# Patient Record
Sex: MALE | Race: WHITE | NOT HISPANIC OR LATINO | URBAN - METROPOLITAN AREA
[De-identification: names, ages, dates, MRNs, and addresses within clinical notes are randomized per-mention and may not be internally consistent; named-entity substitution may affect disease eponyms.]

---

## 2017-01-03 ENCOUNTER — OFFICE (AMBULATORY)
Dept: URBAN - METROPOLITAN AREA CLINIC 75 | Facility: CLINIC | Age: 37
End: 2017-01-03
Payer: COMMERCIAL

## 2017-01-03 VITALS
HEART RATE: 88 BPM | DIASTOLIC BLOOD PRESSURE: 88 MMHG | HEIGHT: 76 IN | SYSTOLIC BLOOD PRESSURE: 126 MMHG | WEIGHT: 240 LBS

## 2017-01-03 DIAGNOSIS — K50.90 CROHN'S DISEASE, UNSPECIFIED, WITHOUT COMPLICATIONS: ICD-10-CM

## 2017-01-03 PROCEDURE — 99213 OFFICE O/P EST LOW 20 MIN: CPT | Performed by: INTERNAL MEDICINE

## 2017-03-15 VITALS
DIASTOLIC BLOOD PRESSURE: 61 MMHG | HEART RATE: 66 BPM | DIASTOLIC BLOOD PRESSURE: 94 MMHG | DIASTOLIC BLOOD PRESSURE: 83 MMHG | RESPIRATION RATE: 14 BRPM | HEART RATE: 89 BPM | TEMPERATURE: 97.1 F | DIASTOLIC BLOOD PRESSURE: 84 MMHG | OXYGEN SATURATION: 97 % | DIASTOLIC BLOOD PRESSURE: 73 MMHG | SYSTOLIC BLOOD PRESSURE: 117 MMHG | SYSTOLIC BLOOD PRESSURE: 110 MMHG | OXYGEN SATURATION: 95 % | SYSTOLIC BLOOD PRESSURE: 112 MMHG | DIASTOLIC BLOOD PRESSURE: 72 MMHG | HEART RATE: 82 BPM | HEART RATE: 92 BPM | OXYGEN SATURATION: 99 % | RESPIRATION RATE: 18 BRPM | TEMPERATURE: 98 F | RESPIRATION RATE: 15 BRPM | HEIGHT: 76 IN | DIASTOLIC BLOOD PRESSURE: 67 MMHG | OXYGEN SATURATION: 98 % | RESPIRATION RATE: 16 BRPM | HEART RATE: 86 BPM | HEART RATE: 74 BPM | DIASTOLIC BLOOD PRESSURE: 75 MMHG | OXYGEN SATURATION: 96 % | SYSTOLIC BLOOD PRESSURE: 122 MMHG | WEIGHT: 240 LBS | HEART RATE: 87 BPM | HEART RATE: 67 BPM | SYSTOLIC BLOOD PRESSURE: 121 MMHG | SYSTOLIC BLOOD PRESSURE: 139 MMHG | HEART RATE: 88 BPM | SYSTOLIC BLOOD PRESSURE: 140 MMHG | DIASTOLIC BLOOD PRESSURE: 71 MMHG | SYSTOLIC BLOOD PRESSURE: 107 MMHG

## 2017-03-17 ENCOUNTER — OFFICE (AMBULATORY)
Dept: URBAN - METROPOLITAN AREA CLINIC 64 | Facility: CLINIC | Age: 37
End: 2017-03-17
Payer: COMMERCIAL

## 2017-03-17 ENCOUNTER — AMBULATORY SURGICAL CENTER (AMBULATORY)
Dept: URBAN - METROPOLITAN AREA SURGERY 17 | Facility: SURGERY | Age: 37
End: 2017-03-17
Payer: COMMERCIAL

## 2017-03-17 DIAGNOSIS — K29.50 UNSPECIFIED CHRONIC GASTRITIS WITHOUT BLEEDING: ICD-10-CM

## 2017-03-17 DIAGNOSIS — R19.7 DIARRHEA, UNSPECIFIED: ICD-10-CM

## 2017-03-17 DIAGNOSIS — K63.3 ULCER OF INTESTINE: ICD-10-CM

## 2017-03-17 DIAGNOSIS — K21.0 GASTRO-ESOPHAGEAL REFLUX DISEASE WITH ESOPHAGITIS: ICD-10-CM

## 2017-03-17 DIAGNOSIS — R10.84 GENERALIZED ABDOMINAL PAIN: ICD-10-CM

## 2017-03-17 DIAGNOSIS — K50.90 CROHN'S DISEASE, UNSPECIFIED, WITHOUT COMPLICATIONS: ICD-10-CM

## 2017-03-17 DIAGNOSIS — K31.89 OTHER DISEASES OF STOMACH AND DUODENUM: ICD-10-CM

## 2017-03-17 DIAGNOSIS — K92.2 GASTROINTESTINAL HEMORRHAGE, UNSPECIFIED: ICD-10-CM

## 2017-03-17 DIAGNOSIS — R93.3 ABNORMAL FINDINGS ON DIAGNOSTIC IMAGING OF OTHER PARTS OF DI: ICD-10-CM

## 2017-03-17 DIAGNOSIS — K52.9 NONINFECTIVE GASTROENTERITIS AND COLITIS, UNSPECIFIED: ICD-10-CM

## 2017-03-17 PROBLEM — K20.9 ESOPHAGITIS, UNSPECIFIED: Status: ACTIVE | Noted: 2017-03-17

## 2017-03-17 LAB
GI HISTOLOGY: A. SELECT: (no result)
GI HISTOLOGY: B. UNSPECIFIED: (no result)
GI HISTOLOGY: C. SELECT: (no result)
GI HISTOLOGY: D. SELECT: (no result)
GI HISTOLOGY: PDF REPORT: (no result)

## 2017-03-17 PROCEDURE — 88312 SPECIAL STAINS GROUP 1: CPT | Performed by: INTERNAL MEDICINE

## 2017-03-17 PROCEDURE — 88305 TISSUE EXAM BY PATHOLOGIST: CPT | Performed by: INTERNAL MEDICINE

## 2017-03-17 PROCEDURE — 43239 EGD BIOPSY SINGLE/MULTIPLE: CPT | Performed by: INTERNAL MEDICINE

## 2017-03-17 PROCEDURE — 88342 IMHCHEM/IMCYTCHM 1ST ANTB: CPT | Performed by: INTERNAL MEDICINE

## 2017-03-17 PROCEDURE — 45380 COLONOSCOPY AND BIOPSY: CPT | Performed by: INTERNAL MEDICINE

## 2017-03-17 RX ADMIN — PROPOFOL 50 MG: 10 INJECTION, EMULSION INTRAVENOUS at 11:13

## 2017-03-17 RX ADMIN — PROPOFOL 50 MG: 10 INJECTION, EMULSION INTRAVENOUS at 11:25

## 2017-03-17 RX ADMIN — PROPOFOL 50 MG: 10 INJECTION, EMULSION INTRAVENOUS at 11:17

## 2017-03-17 RX ADMIN — PROPOFOL 50 MG: 10 INJECTION, EMULSION INTRAVENOUS at 11:15

## 2017-03-17 RX ADMIN — PROPOFOL 100 MG: 10 INJECTION, EMULSION INTRAVENOUS at 11:13

## 2017-03-17 RX ADMIN — PROPOFOL 50 MG: 10 INJECTION, EMULSION INTRAVENOUS at 11:20

## 2017-03-17 NOTE — SERVICENOTES
If patient get's worse,,,he will need to present to hospital. Check Prom[facility] testing, cdiff and re-eval as to how to proceed with tx. 
Patient w/recent C diff infx ( tx'd ) .... so hard to say if today's findings are from residual C diff dx vs IBD.....

## 2017-03-17 NOTE — SERVICEHPINOTES
Patient having increased GI problems lately,,,recently found to be C diff +. Here today for EGD and CN given hx of Crohns,,and intermittent flares ( due to IBD vs Infx ?? )

## 2017-05-01 PROBLEM — K92.2 GASTROINTESTINAL HEMORRHAGE, UNSPECIFIED: Status: ACTIVE | Noted: 2017-03-17

## 2018-01-29 ENCOUNTER — OFFICE VISIT CONVERTED (OUTPATIENT)
Dept: GASTROENTEROLOGY | Facility: CLINIC | Age: 38
End: 2018-01-29
Attending: INTERNAL MEDICINE

## 2018-02-20 ENCOUNTER — OFFICE VISIT CONVERTED (OUTPATIENT)
Dept: FAMILY MEDICINE CLINIC | Age: 38
End: 2018-02-20
Attending: NURSE PRACTITIONER

## 2018-07-18 ENCOUNTER — OFFICE VISIT CONVERTED (OUTPATIENT)
Dept: GASTROENTEROLOGY | Facility: CLINIC | Age: 38
End: 2018-07-18
Attending: NURSE PRACTITIONER

## 2019-01-21 ENCOUNTER — HOSPITAL ENCOUNTER (OUTPATIENT)
Dept: LAB | Facility: HOSPITAL | Age: 39
Discharge: HOME | End: 2019-01-21
Attending: NURSE PRACTITIONER

## 2019-01-21 ENCOUNTER — OFFICE VISIT CONVERTED (OUTPATIENT)
Dept: GASTROENTEROLOGY | Facility: CLINIC | Age: 39
End: 2019-01-21
Attending: NURSE PRACTITIONER

## 2019-01-21 LAB
ALBUMIN SERPL-MCNC: 4.3 G/DL (ref 3.5–5)
ALBUMIN/GLOB SERPL: 1.2 {RATIO} (ref 1.4–2.6)
ALP SERPL-CCNC: 99 U/L (ref 53–128)
ALT SERPL-CCNC: 18 U/L (ref 10–40)
ANION GAP SERPL CALC-SCNC: 18 MMOL/L (ref 8–19)
AST SERPL-CCNC: 15 U/L (ref 15–50)
BASOPHILS # BLD AUTO: 0.03 10*3/UL (ref 0–0.2)
BASOPHILS NFR BLD AUTO: 0.21 % (ref 0–3)
BILIRUB SERPL-MCNC: 0.63 MG/DL (ref 0.2–1.3)
BUN SERPL-MCNC: 9 MG/DL (ref 5–25)
BUN/CREAT SERPL: 9 {RATIO} (ref 6–20)
CALCIUM SERPL-MCNC: 9.3 MG/DL (ref 8.7–10.4)
CHLORIDE SERPL-SCNC: 100 MMOL/L (ref 99–111)
CONV CO2: 25 MMOL/L (ref 22–32)
CONV TOTAL PROTEIN: 7.9 G/DL (ref 6.3–8.2)
CREAT UR-MCNC: 1.04 MG/DL (ref 0.7–1.2)
CRP SERPL-MCNC: 64.8 MG/L (ref 0–5)
EOSINOPHIL # BLD AUTO: 0.02 10*3/UL (ref 0–0.7)
EOSINOPHIL # BLD AUTO: 0.15 % (ref 0–7)
ERYTHROCYTE [DISTWIDTH] IN BLOOD BY AUTOMATED COUNT: 13.1 % (ref 11.5–14.5)
ERYTHROCYTE [SEDIMENTATION RATE] IN BLOOD: 16 MM/H (ref 0–20)
GFR SERPLBLD BASED ON 1.73 SQ M-ARVRAT: >60 ML/MIN/{1.73_M2}
GLOBULIN UR ELPH-MCNC: 3.6 G/DL (ref 2–3.5)
GLUCOSE SERPL-MCNC: 107 MG/DL (ref 70–99)
HBA1C MFR BLD: 15.2 G/DL (ref 14–18)
HCT VFR BLD AUTO: 43.9 % (ref 42–52)
LYMPHOCYTES # BLD AUTO: 1.4 10*3/UL (ref 1–5)
MCH RBC QN AUTO: 30 PG (ref 27–31)
MCHC RBC AUTO-ENTMCNC: 34.6 G/DL (ref 33–37)
MCV RBC AUTO: 86.9 FL (ref 80–96)
MONOCYTES # BLD AUTO: 0.8 10*3/UL (ref 0.2–1.2)
MONOCYTES NFR BLD AUTO: 5.35 % (ref 3–10)
NEUTROPHILS # BLD AUTO: 12.6 10*3/UL (ref 2–8)
NEUTROPHILS NFR BLD AUTO: 84.9 % (ref 30–85)
NRBC BLD AUTO-RTO: 0 % (ref 0–0.01)
OSMOLALITY SERPL CALC.SUM OF ELEC: 287 MOSM/KG (ref 273–304)
PLATELET # BLD AUTO: 208 10*3/UL (ref 130–400)
PMV BLD AUTO: 10.4 FL (ref 7.4–10.4)
POTASSIUM SERPL-SCNC: 3.9 MMOL/L (ref 3.5–5.3)
RBC # BLD AUTO: 5.05 10*6/UL (ref 4.7–6.1)
SODIUM SERPL-SCNC: 139 MMOL/L (ref 135–147)
VARIANT LYMPHS NFR BLD MANUAL: 9.4 % (ref 20–45)
WBC # BLD AUTO: 14.9 10*3/UL (ref 4.8–10.8)

## 2019-01-28 ENCOUNTER — OFFICE VISIT CONVERTED (OUTPATIENT)
Dept: FAMILY MEDICINE CLINIC | Age: 39
End: 2019-01-28
Attending: NURSE PRACTITIONER

## 2019-02-08 ENCOUNTER — HOSPITAL ENCOUNTER (OUTPATIENT)
Dept: INFUSION THERAPY | Facility: HOSPITAL | Age: 39
Setting detail: RECURRING SERIES
Discharge: HOME | End: 2019-02-28
Attending: NURSE PRACTITIONER

## 2019-02-25 ENCOUNTER — OFFICE VISIT CONVERTED (OUTPATIENT)
Dept: FAMILY MEDICINE CLINIC | Age: 39
End: 2019-02-25
Attending: NURSE PRACTITIONER

## 2019-05-20 ENCOUNTER — OFFICE VISIT CONVERTED (OUTPATIENT)
Dept: UROLOGY | Facility: CLINIC | Age: 39
End: 2019-05-20
Attending: UROLOGY

## 2019-06-28 ENCOUNTER — PROCEDURE VISIT CONVERTED (OUTPATIENT)
Dept: UROLOGY | Facility: CLINIC | Age: 39
End: 2019-06-28
Attending: UROLOGY

## 2019-07-19 ENCOUNTER — OFFICE VISIT CONVERTED (OUTPATIENT)
Dept: UROLOGY | Facility: CLINIC | Age: 39
End: 2019-07-19
Attending: UROLOGY

## 2019-07-22 ENCOUNTER — OFFICE VISIT CONVERTED (OUTPATIENT)
Dept: GASTROENTEROLOGY | Facility: CLINIC | Age: 39
End: 2019-07-22
Attending: NURSE PRACTITIONER

## 2019-07-22 ENCOUNTER — HOSPITAL ENCOUNTER (OUTPATIENT)
Dept: LAB | Facility: HOSPITAL | Age: 39
Discharge: HOME | End: 2019-07-22
Attending: NURSE PRACTITIONER

## 2019-07-22 LAB
25(OH)D3 SERPL-MCNC: 30.3 NG/ML (ref 30–100)
ALBUMIN SERPL-MCNC: 4.3 G/DL (ref 3.5–5)
ALBUMIN/GLOB SERPL: 1.3 {RATIO} (ref 1.4–2.6)
ALP SERPL-CCNC: 123 U/L (ref 53–128)
ALT SERPL-CCNC: 21 U/L (ref 10–40)
ANION GAP SERPL CALC-SCNC: 20 MMOL/L (ref 8–19)
AST SERPL-CCNC: 21 U/L (ref 15–50)
BASOPHILS # BLD AUTO: 0.07 10*3/UL (ref 0–0.2)
BASOPHILS NFR BLD AUTO: 0.7 % (ref 0–3)
BILIRUB SERPL-MCNC: 0.36 MG/DL (ref 0.2–1.3)
BUN SERPL-MCNC: 10 MG/DL (ref 5–25)
BUN/CREAT SERPL: 12 {RATIO} (ref 6–20)
CALCIUM SERPL-MCNC: 9.4 MG/DL (ref 8.7–10.4)
CHLORIDE SERPL-SCNC: 105 MMOL/L (ref 99–111)
CONV ABS IMM GRAN: 0.04 10*3/UL (ref 0–0.2)
CONV CO2: 21 MMOL/L (ref 22–32)
CONV IMMATURE GRAN: 0.4 % (ref 0–1.8)
CONV TOTAL PROTEIN: 7.7 G/DL (ref 6.3–8.2)
CREAT UR-MCNC: 0.83 MG/DL (ref 0.7–1.2)
CRP SERPL-MCNC: 21.2 MG/L (ref 0–5)
DEPRECATED RDW RBC AUTO: 44.7 FL (ref 35.1–43.9)
EOSINOPHIL # BLD AUTO: 0.44 10*3/UL (ref 0–0.7)
EOSINOPHIL # BLD AUTO: 4.4 % (ref 0–7)
ERYTHROCYTE [DISTWIDTH] IN BLOOD BY AUTOMATED COUNT: 13.9 % (ref 11.6–14.4)
ERYTHROCYTE [SEDIMENTATION RATE] IN BLOOD: 5 MM/H (ref 0–20)
GFR SERPLBLD BASED ON 1.73 SQ M-ARVRAT: >60 ML/MIN/{1.73_M2}
GLOBULIN UR ELPH-MCNC: 3.4 G/DL (ref 2–3.5)
GLUCOSE SERPL-MCNC: 89 MG/DL (ref 70–99)
HBA1C MFR BLD: 14.4 G/DL (ref 14–18)
HCT VFR BLD AUTO: 44.2 % (ref 42–52)
IRON SATN MFR SERPL: 21 % (ref 20–55)
IRON SERPL-MCNC: 67 UG/DL (ref 70–180)
LYMPHOCYTES # BLD AUTO: 2.92 10*3/UL (ref 1–5)
MCH RBC QN AUTO: 28.9 PG (ref 27–31)
MCHC RBC AUTO-ENTMCNC: 32.6 G/DL (ref 33–37)
MCV RBC AUTO: 88.8 FL (ref 80–96)
MONOCYTES # BLD AUTO: 0.68 10*3/UL (ref 0.2–1.2)
MONOCYTES NFR BLD AUTO: 6.8 % (ref 3–10)
NEUTROPHILS # BLD AUTO: 5.91 10*3/UL (ref 2–8)
NEUTROPHILS NFR BLD AUTO: 58.7 % (ref 30–85)
NRBC CBCN: 0 % (ref 0–0.7)
OSMOLALITY SERPL CALC.SUM OF ELEC: 293 MOSM/KG (ref 273–304)
PLATELET # BLD AUTO: 287 10*3/UL (ref 130–400)
PMV BLD AUTO: 12.4 FL (ref 9.4–12.4)
POTASSIUM SERPL-SCNC: 3.8 MMOL/L (ref 3.5–5.3)
RBC # BLD AUTO: 4.98 10*6/UL (ref 4.7–6.1)
SODIUM SERPL-SCNC: 142 MMOL/L (ref 135–147)
TIBC SERPL-MCNC: 326 UG/DL (ref 245–450)
TRANSFERRIN SERPL-MCNC: 228 MG/DL (ref 215–365)
VARIANT LYMPHS NFR BLD MANUAL: 29 % (ref 20–45)
WBC # BLD AUTO: 10.06 10*3/UL (ref 4.8–10.8)

## 2019-07-24 LAB
CONV QUANTIFERON TB GOLD: NEGATIVE
QUANTIFERON CRITERIA: NORMAL
QUANTIFERON MITOGEN VALUE: >10 IU/ML
QUANTIFERON NIL VALUE: 0.06 IU/ML
QUANTIFERON TB1 AG VALUE: 0.09 IU/ML
QUANTIFERON TB2 AG VALUE: 0.08 IU/ML

## 2019-10-03 ENCOUNTER — HOSPITAL ENCOUNTER (OUTPATIENT)
Dept: OTHER | Facility: HOSPITAL | Age: 39
Discharge: HOME | End: 2019-10-03
Attending: FAMILY MEDICINE

## 2019-10-03 ENCOUNTER — OFFICE VISIT CONVERTED (OUTPATIENT)
Dept: FAMILY MEDICINE CLINIC | Age: 39
End: 2019-10-03
Attending: FAMILY MEDICINE

## 2019-10-03 LAB
CHOLEST SERPL-MCNC: 140 MG/DL (ref 107–200)
CHOLEST/HDLC SERPL: 6.4 {RATIO} (ref 3–6)
HDLC SERPL-MCNC: 22 MG/DL (ref 40–60)
LDLC SERPL CALC-MCNC: 77 MG/DL (ref 70–100)
TRIGL SERPL-MCNC: 203 MG/DL (ref 40–150)
TSH SERPL-ACNC: 1.86 M[IU]/L (ref 0.27–4.2)
VLDLC SERPL-MCNC: 41 MG/DL (ref 5–37)

## 2019-10-21 ENCOUNTER — OFFICE VISIT CONVERTED (OUTPATIENT)
Dept: UROLOGY | Facility: CLINIC | Age: 39
End: 2019-10-21
Attending: UROLOGY

## 2019-12-02 ENCOUNTER — HOSPITAL ENCOUNTER (OUTPATIENT)
Dept: OTHER | Facility: HOSPITAL | Age: 39
Discharge: HOME | End: 2019-12-02
Attending: FAMILY MEDICINE

## 2019-12-02 ENCOUNTER — OFFICE VISIT CONVERTED (OUTPATIENT)
Dept: FAMILY MEDICINE CLINIC | Age: 39
End: 2019-12-02
Attending: FAMILY MEDICINE

## 2019-12-04 LAB — BACTERIA SPEC AEROBE CULT: NORMAL

## 2020-02-26 ENCOUNTER — HOSPITAL ENCOUNTER (OUTPATIENT)
Dept: OTHER | Facility: HOSPITAL | Age: 40
Discharge: HOME | End: 2020-02-26
Attending: FAMILY MEDICINE

## 2020-02-26 LAB
CHOLEST SERPL-MCNC: 155 MG/DL (ref 107–200)
CHOLEST/HDLC SERPL: 6 {RATIO} (ref 3–6)
HDLC SERPL-MCNC: 26 MG/DL (ref 40–60)
LDLC SERPL CALC-MCNC: 95 MG/DL (ref 70–100)
TRIGL SERPL-MCNC: 172 MG/DL (ref 40–150)
VLDLC SERPL-MCNC: 34 MG/DL (ref 5–37)

## 2020-02-27 ENCOUNTER — OFFICE VISIT CONVERTED (OUTPATIENT)
Dept: FAMILY MEDICINE CLINIC | Age: 40
End: 2020-02-27
Attending: FAMILY MEDICINE

## 2020-03-02 ENCOUNTER — OFFICE VISIT CONVERTED (OUTPATIENT)
Dept: GASTROENTEROLOGY | Facility: CLINIC | Age: 40
End: 2020-03-02
Attending: NURSE PRACTITIONER

## 2020-06-15 ENCOUNTER — OFFICE VISIT CONVERTED (OUTPATIENT)
Dept: FAMILY MEDICINE CLINIC | Age: 40
End: 2020-06-15
Attending: FAMILY MEDICINE

## 2020-06-15 ENCOUNTER — HOSPITAL ENCOUNTER (OUTPATIENT)
Dept: OTHER | Facility: HOSPITAL | Age: 40
Discharge: HOME | End: 2020-06-15
Attending: FAMILY MEDICINE

## 2020-06-19 ENCOUNTER — HOSPITAL ENCOUNTER (OUTPATIENT)
Dept: PHYSICAL THERAPY | Facility: CLINIC | Age: 40
Setting detail: RECURRING SERIES
Discharge: HOME | End: 2020-09-18
Attending: FAMILY MEDICINE

## 2020-07-29 ENCOUNTER — HOSPITAL ENCOUNTER (OUTPATIENT)
Dept: OTHER | Facility: HOSPITAL | Age: 40
Discharge: HOME | End: 2020-07-29
Attending: NURSE PRACTITIONER

## 2020-07-29 LAB
ALBUMIN SERPL-MCNC: 4.3 G/DL (ref 3.5–5)
ALBUMIN/GLOB SERPL: 1.4 {RATIO} (ref 1.4–2.6)
ALP SERPL-CCNC: 114 U/L (ref 53–128)
ALT SERPL-CCNC: 13 U/L (ref 10–40)
ANION GAP SERPL CALC-SCNC: 17 MMOL/L (ref 8–19)
AST SERPL-CCNC: 17 U/L (ref 15–50)
BASOPHILS # BLD AUTO: 0.08 10*3/UL (ref 0–0.2)
BASOPHILS NFR BLD AUTO: 0.8 % (ref 0–3)
BILIRUB SERPL-MCNC: 0.32 MG/DL (ref 0.2–1.3)
BUN SERPL-MCNC: 9 MG/DL (ref 5–25)
BUN/CREAT SERPL: 11 {RATIO} (ref 6–20)
CALCIUM SERPL-MCNC: 9.9 MG/DL (ref 8.7–10.4)
CHLORIDE SERPL-SCNC: 107 MMOL/L (ref 99–111)
CONV ABS IMM GRAN: 0.03 10*3/UL (ref 0–0.2)
CONV CO2: 23 MMOL/L (ref 22–32)
CONV IMMATURE GRAN: 0.3 % (ref 0–1.8)
CONV TOTAL PROTEIN: 7.3 G/DL (ref 6.3–8.2)
CREAT UR-MCNC: 0.8 MG/DL (ref 0.7–1.2)
DEPRECATED RDW RBC AUTO: 46 FL (ref 35.1–43.9)
EOSINOPHIL # BLD AUTO: 0.35 10*3/UL (ref 0–0.7)
EOSINOPHIL # BLD AUTO: 3.6 % (ref 0–7)
ERYTHROCYTE [DISTWIDTH] IN BLOOD BY AUTOMATED COUNT: 14.2 % (ref 11.6–14.4)
GFR SERPLBLD BASED ON 1.73 SQ M-ARVRAT: >60 ML/MIN/{1.73_M2}
GLOBULIN UR ELPH-MCNC: 3 G/DL (ref 2–3.5)
GLUCOSE SERPL-MCNC: 86 MG/DL (ref 70–99)
HCT VFR BLD AUTO: 46.5 % (ref 42–52)
HGB BLD-MCNC: 14.8 G/DL (ref 14–18)
LYMPHOCYTES # BLD AUTO: 2.67 10*3/UL (ref 1–5)
LYMPHOCYTES NFR BLD AUTO: 27.4 % (ref 20–45)
MCH RBC QN AUTO: 28.5 PG (ref 27–31)
MCHC RBC AUTO-ENTMCNC: 31.8 G/DL (ref 33–37)
MCV RBC AUTO: 89.4 FL (ref 80–96)
MONOCYTES # BLD AUTO: 0.57 10*3/UL (ref 0.2–1.2)
MONOCYTES NFR BLD AUTO: 5.8 % (ref 3–10)
NEUTROPHILS # BLD AUTO: 6.06 10*3/UL (ref 2–8)
NEUTROPHILS NFR BLD AUTO: 62.1 % (ref 30–85)
NRBC CBCN: 0 % (ref 0–0.7)
OSMOLALITY SERPL CALC.SUM OF ELEC: 294 MOSM/KG (ref 273–304)
PLATELET # BLD AUTO: 301 10*3/UL (ref 130–400)
PMV BLD AUTO: 12.5 FL (ref 9.4–12.4)
POTASSIUM SERPL-SCNC: 3.9 MMOL/L (ref 3.5–5.3)
RBC # BLD AUTO: 5.2 10*6/UL (ref 4.7–6.1)
SODIUM SERPL-SCNC: 143 MMOL/L (ref 135–147)
WBC # BLD AUTO: 9.76 10*3/UL (ref 4.8–10.8)

## 2020-09-02 ENCOUNTER — OFFICE VISIT CONVERTED (OUTPATIENT)
Dept: GASTROENTEROLOGY | Facility: CLINIC | Age: 40
End: 2020-09-02
Attending: NURSE PRACTITIONER

## 2020-09-03 ENCOUNTER — HOSPITAL ENCOUNTER (OUTPATIENT)
Dept: OTHER | Facility: HOSPITAL | Age: 40
Discharge: HOME | End: 2020-09-03
Attending: FAMILY MEDICINE

## 2020-09-03 LAB — ERYTHROCYTE [SEDIMENTATION RATE] IN BLOOD: 14 MM/H (ref 0–20)

## 2020-09-04 LAB
25(OH)D3 SERPL-MCNC: 31.1 NG/ML (ref 30–100)
ALBUMIN SERPL-MCNC: 4 G/DL (ref 3.5–5)
ALBUMIN/GLOB SERPL: 1.5 {RATIO} (ref 1.4–2.6)
ALP SERPL-CCNC: 113 U/L (ref 53–128)
ALT SERPL-CCNC: 13 U/L (ref 10–40)
ANION GAP SERPL CALC-SCNC: 14 MMOL/L (ref 8–19)
AST SERPL-CCNC: 15 U/L (ref 15–50)
BASOPHILS # BLD MANUAL: 0.07 10*3/UL (ref 0–0.2)
BASOPHILS NFR BLD MANUAL: 0.9 % (ref 0–3)
BILIRUB SERPL-MCNC: 0.47 MG/DL (ref 0.2–1.3)
BUN SERPL-MCNC: 7 MG/DL (ref 5–25)
BUN/CREAT SERPL: 9 {RATIO} (ref 6–20)
CALCIUM SERPL-MCNC: 9 MG/DL (ref 8.7–10.4)
CHLORIDE SERPL-SCNC: 105 MMOL/L (ref 99–111)
CHOLEST SERPL-MCNC: 144 MG/DL (ref 107–200)
CHOLEST/HDLC SERPL: 6.5 {RATIO} (ref 3–6)
CONV CO2: 24 MMOL/L (ref 22–32)
CONV TOTAL PROTEIN: 6.7 G/DL (ref 6.3–8.2)
CREAT UR-MCNC: 0.74 MG/DL (ref 0.7–1.2)
CRP SERPL-MCNC: 22 MG/L (ref 0–5)
DEPRECATED RDW RBC AUTO: 44.5 FL
EOSINOPHIL # BLD MANUAL: 0.36 10*3/UL (ref 0–0.7)
EOSINOPHIL NFR BLD MANUAL: 4.6 % (ref 0–7)
ERYTHROCYTE [DISTWIDTH] IN BLOOD BY AUTOMATED COUNT: 13.9 % (ref 11.5–14.5)
GFR SERPLBLD BASED ON 1.73 SQ M-ARVRAT: >60 ML/MIN/{1.73_M2}
GLOBULIN UR ELPH-MCNC: 2.7 G/DL (ref 2–3.5)
GLUCOSE SERPL-MCNC: 89 MG/DL (ref 70–99)
GRANS (ABSOLUTE): 4.67 10*3/UL (ref 2–8)
GRANS: 59.7 % (ref 30–85)
HBA1C MFR BLD: 14.3 G/DL (ref 14–18)
HCT VFR BLD AUTO: 43.4 % (ref 42–52)
HDLC SERPL-MCNC: 22 MG/DL (ref 40–60)
IMM GRANULOCYTES # BLD: 0.01 10*3/UL (ref 0–0.54)
IMM GRANULOCYTES NFR BLD: 0.1 % (ref 0–0.43)
IRON SATN MFR SERPL: 26 % (ref 20–55)
IRON SERPL-MCNC: 81 UG/DL (ref 70–180)
LDLC SERPL CALC-MCNC: 92 MG/DL (ref 70–100)
LYMPHOCYTES # BLD MANUAL: 2.24 10*3/UL (ref 1–5)
LYMPHOCYTES NFR BLD MANUAL: 6.1 % (ref 3–10)
MCH RBC QN AUTO: 28.4 PG (ref 27–31)
MCHC RBC AUTO-ENTMCNC: 32.9 G/DL (ref 33–37)
MCV RBC AUTO: 86.3 FL (ref 80–96)
MONOCYTES # BLD AUTO: 0.48 10*3/UL (ref 0.2–1.2)
OSMOLALITY SERPL CALC.SUM OF ELEC: 285 MOSM/KG (ref 273–304)
PLATELET # BLD AUTO: 300 10*3/UL (ref 130–400)
PMV BLD AUTO: 11.4 FL (ref 7.4–10.4)
POTASSIUM SERPL-SCNC: 3.7 MMOL/L (ref 3.5–5.3)
RBC # BLD AUTO: 5.03 10*6/UL (ref 4.7–6.1)
SODIUM SERPL-SCNC: 139 MMOL/L (ref 135–147)
TIBC SERPL-MCNC: 310 UG/DL (ref 245–450)
TRANSFERRIN SERPL-MCNC: 217 MG/DL (ref 215–365)
TRIGL SERPL-MCNC: 150 MG/DL (ref 40–150)
TSH SERPL-ACNC: 2.62 M[IU]/L (ref 0.27–4.2)
VARIANT LYMPHS NFR BLD MANUAL: 28.6 % (ref 20–45)
VLDLC SERPL-MCNC: 30 MG/DL (ref 5–37)
WBC # BLD AUTO: 7.83 10*3/UL (ref 4.8–10.8)

## 2020-09-08 ENCOUNTER — OFFICE VISIT CONVERTED (OUTPATIENT)
Dept: FAMILY MEDICINE CLINIC | Age: 40
End: 2020-09-08
Attending: FAMILY MEDICINE

## 2020-09-08 LAB
CONV QUANTIFERON TB GOLD: NEGATIVE
QUANTIFERON CRITERIA: NORMAL
QUANTIFERON MITOGEN VALUE: >10 IU/ML
QUANTIFERON NIL VALUE: 0.05 IU/ML
QUANTIFERON TB1 AG VALUE: 0.1 IU/ML
QUANTIFERON TB2 AG VALUE: 0.08 IU/ML

## 2020-09-18 ENCOUNTER — HOSPITAL ENCOUNTER (OUTPATIENT)
Dept: OTHER | Facility: HOSPITAL | Age: 40
Discharge: HOME | End: 2020-09-18
Attending: FAMILY MEDICINE

## 2020-10-30 ENCOUNTER — HOSPITAL ENCOUNTER (OUTPATIENT)
Dept: OTHER | Facility: HOSPITAL | Age: 40
Discharge: HOME | End: 2020-10-30
Attending: NURSE PRACTITIONER

## 2020-11-04 ENCOUNTER — HOSPITAL ENCOUNTER (OUTPATIENT)
Dept: GASTROENTEROLOGY | Facility: HOSPITAL | Age: 40
Setting detail: HOSPITAL OUTPATIENT SURGERY
Discharge: HOME | End: 2020-11-04
Attending: INTERNAL MEDICINE

## 2020-11-04 LAB — SARS-COV-2 RNA SPEC QL NAA+PROBE: NOT DETECTED

## 2020-11-05 LAB — SARS-COV-2 RNA SPEC QL NAA+PROBE: NOT DETECTED

## 2021-01-07 ENCOUNTER — HOSPITAL ENCOUNTER (OUTPATIENT)
Dept: OTHER | Facility: HOSPITAL | Age: 41
Discharge: HOME | End: 2021-01-07
Attending: NURSE PRACTITIONER

## 2021-01-07 LAB
ALBUMIN SERPL-MCNC: 4 G/DL (ref 3.5–5)
ALBUMIN/GLOB SERPL: 1.4 {RATIO} (ref 1.4–2.6)
ALP SERPL-CCNC: 137 U/L (ref 53–128)
ALT SERPL-CCNC: 13 U/L (ref 10–40)
ANION GAP SERPL CALC-SCNC: 14 MMOL/L (ref 8–19)
APPEARANCE UR: CLEAR
AST SERPL-CCNC: 14 U/L (ref 15–50)
BACTERIA UR QL AUTO: ABNORMAL
BASOPHILS # BLD MANUAL: 0.03 10*3/UL (ref 0–0.2)
BASOPHILS NFR BLD MANUAL: 0.3 % (ref 0–3)
BILIRUB SERPL-MCNC: 0.41 MG/DL (ref 0.2–1.3)
BILIRUB UR QL: NEGATIVE
BUN SERPL-MCNC: 7 MG/DL (ref 5–25)
BUN/CREAT SERPL: 10 {RATIO} (ref 6–20)
CALCIUM SERPL-MCNC: 9.1 MG/DL (ref 8.7–10.4)
CASTS URNS QL MICRO: ABNORMAL /[LPF]
CHLORIDE SERPL-SCNC: 106 MMOL/L (ref 99–111)
CK SERPL-CCNC: 59 U/L (ref 57–374)
COLOR UR: YELLOW
CONV CO2: 24 MMOL/L (ref 22–32)
CONV LEUKOCYTE ESTERASE: NEGATIVE
CONV TOTAL PROTEIN: 6.9 G/DL (ref 6.3–8.2)
CONV UROBILINOGEN IN URINE BY AUTOMATED TEST STRIP: 1 {EHRLICHU}/DL (ref 0.1–1)
CREAT UR-MCNC: 0.68 MG/DL (ref 0.7–1.2)
CRP SERPL-MCNC: 18.3 MG/L (ref 0–5)
DEPRECATED RDW RBC AUTO: 45.9 FL
EOSINOPHIL # BLD MANUAL: 0.37 10*3/UL (ref 0–0.7)
EOSINOPHIL NFR BLD MANUAL: 3.6 % (ref 0–7)
EPI CELLS #/AREA URNS HPF: ABNORMAL /[HPF]
ERYTHROCYTE [DISTWIDTH] IN BLOOD BY AUTOMATED COUNT: 14.2 % (ref 11.5–14.5)
ERYTHROCYTE [SEDIMENTATION RATE] IN BLOOD: 11 MM/H (ref 0–20)
FERRITIN SERPL-MCNC: 119 NG/ML (ref 30–300)
FOLATE SERPL-MCNC: 8 NG/ML (ref 4.8–20)
GFR SERPLBLD BASED ON 1.73 SQ M-ARVRAT: >60 ML/MIN/{1.73_M2}
GLOBULIN UR ELPH-MCNC: 2.9 G/DL (ref 2–3.5)
GLUCOSE 24H UR-MCNC: NEGATIVE MG/DL
GLUCOSE SERPL-MCNC: 89 MG/DL (ref 70–99)
GRANS (ABSOLUTE): 6.61 10*3/UL (ref 2–8)
GRANS: 64.3 % (ref 30–85)
HBA1C MFR BLD: 14.3 G/DL (ref 14–18)
HCT VFR BLD AUTO: 43.1 % (ref 42–52)
HGB UR QL STRIP: NEGATIVE
IMM GRANULOCYTES # BLD: 0.01 10*3/UL (ref 0–0.54)
IMM GRANULOCYTES NFR BLD: 0.1 % (ref 0–0.43)
KETONES UR QL STRIP: NEGATIVE MG/DL
LYMPHOCYTES # BLD MANUAL: 2.68 10*3/UL (ref 1–5)
LYMPHOCYTES NFR BLD MANUAL: 5.6 % (ref 3–10)
MCH RBC QN AUTO: 28.8 PG (ref 27–31)
MCHC RBC AUTO-ENTMCNC: 33.2 G/DL (ref 33–37)
MCV RBC AUTO: 86.7 FL (ref 80–96)
MONOCYTES # BLD AUTO: 0.58 10*3/UL (ref 0.2–1.2)
MUCOUS THREADS URNS QL MICRO: ABNORMAL
NITRITE UR-MCNC: NEGATIVE MG/ML
OSMOLALITY SERPL CALC.SUM OF ELEC: 287 MOSM/KG (ref 273–304)
PH UR STRIP.AUTO: 7.5 [PH] (ref 5–8)
PLATELET # BLD AUTO: 311 10*3/UL (ref 130–400)
PMV BLD AUTO: 11.2 FL (ref 7.4–10.4)
POTASSIUM SERPL-SCNC: 4 MMOL/L (ref 3.5–5.3)
PROT UR-MCNC: NEGATIVE MG/DL
RBC # BLD AUTO: 4.97 10*6/UL (ref 4.7–6.1)
RBC # BLD AUTO: ABNORMAL /[HPF]
SODIUM SERPL-SCNC: 140 MMOL/L (ref 135–147)
SP GR UR STRIP: 1.02 (ref 1–1.03)
SPECIMEN SOURCE: ABNORMAL
T4 FREE SERPL-MCNC: 1.1 NG/DL (ref 0.9–1.8)
TSH SERPL-ACNC: 2.31 M[IU]/L (ref 0.27–4.2)
UNIDENT CRYS URNS QL MICRO: ABNORMAL /[HPF]
VARIANT LYMPHS NFR BLD MANUAL: 26.1 % (ref 20–45)
VIT B12 SERPL-MCNC: 347 PG/ML (ref 211–911)
WBC # BLD AUTO: 10.28 10*3/UL (ref 4.8–10.8)
WBC #/AREA URNS HPF: ABNORMAL /[HPF]

## 2021-01-08 LAB
ALDOLASE SERPL-CCNC: 2.8 U/L (ref 3.3–10.3)
CONV HEPATITIS B SURFACE AG W CONFIRMATION RE: NEGATIVE
HBV CORE AB SER DONR QL IA: NEGATIVE
HBV SURFACE AB SER QL: REACTIVE
HCV AB SER DONR QL: <0.1 S/CO RATIO (ref 0–0.9)

## 2021-03-02 ENCOUNTER — OFFICE VISIT CONVERTED (OUTPATIENT)
Dept: GASTROENTEROLOGY | Facility: CLINIC | Age: 41
End: 2021-03-02
Attending: NURSE PRACTITIONER

## 2021-05-14 VITALS
HEIGHT: 64 IN | TEMPERATURE: 98 F | SYSTOLIC BLOOD PRESSURE: 109 MMHG | WEIGHT: 237.5 LBS | DIASTOLIC BLOOD PRESSURE: 88 MMHG | BODY MASS INDEX: 40.55 KG/M2

## 2021-05-14 VITALS
DIASTOLIC BLOOD PRESSURE: 86 MMHG | HEIGHT: 76 IN | BODY MASS INDEX: 28.91 KG/M2 | WEIGHT: 237.37 LBS | HEART RATE: 84 BPM | SYSTOLIC BLOOD PRESSURE: 138 MMHG

## 2021-05-14 NOTE — PROGRESS NOTES
Progress Note      Patient Name: Hi Amaya   Patient ID: [id number]   Sex: Male   YOB: 1980    Primary Care Provider: Lorena GARBER   Referring Provider: Kylie GARBER    Visit Date: March 2, 2021    Provider: JCARLOS Curran   Location: [facility]   Location Address: [address] [place] [address], [address] [place] [address]  [id number]   Location Phone: [phone]          Chief Complaint  · Follow up IBD      History Of Present Illness     Mr. Amaya presents for follow-up of Crohn's.      11/4/2020 colonoscopy-a few erosions noted in the terminal ileum.  Several diverticula were seen in the terminal ileum.  Diffuse pseudopolyps in the whole colon.  Multiple areas of scar tissue visualized throughout the colon.  Biopsies-terminal ileum-mild active ileitis.  Right colon and left colon biopsies - normal.    9/3/2020 vitamin D-31.1, sed rate-14, CRP-22, TB QuantiFERON gold-negative.  Iron profile: Iron saturation 26%, total iron 81.    He states that Entyvio infusions are going well.      He was diagnosed with ankylosis spondylitis.  Has an appointment with rheumatology next week at [facility].  They are going to start him on methrotrexate.      Reports that bowel movements are regular.  Denies rectal bleeding.             Past Medical History  Back pain; Crohns disease; GERD; Thoracic back pain         Past Surgical History  Colonoscopy; EGD; Shoulder surgery; Vasectomy         Medication List  Entyvio 300 mg intravenous recon soln; folic acid 1 mg oral tablet; methotrexate sodium 25 mg/mL injection solution; Prilosec OTC 20 mg oral tablet,delayed release (DR/EC)         Allergy List  amoxicillin; Percocet       Allergies Reconciled  Family Medical History  Stroke; Diabetes, unspecified type; Heart Attack (MI); Coronary artery disease         Social History  Alcohol (Never); Tobacco (Former)         Review of  "Systems  · Constitutional  o Denies  o : chills, fever  · Cardiovascular  o Denies  o : chest pain, irregular heart beats  · Respiratory  o Denies  o : cough, shortness of breath  · Gastrointestinal  o Admits  o : see HPI   · Endocrine  o Denies  o : weight gain, weight loss      Vitals  Date Time BP Position Site L\R Cuff Size HR RR TEMP (F) WT  HT  BMI kg/m2 BSA m2 O2 Sat FR L/min FiO2 HC       03/02/2021 11:10 /86 Sitting    84 - R   237lbs 6oz 6'  4\" 28.89 2.4             Physical Examination  · Constitutional  o Appearance  o : Healthy-appearing, awake and alert in no acute distress  · Head and Face  o Head  o : Normocephalic with no worriesome skin lesions  · Eyes  o Vision  o :   § Visual Fields  § : eyes move symmetrical in all directions  o Sclerae  o : sclerae anicteric  o Pupils and Irises  o : pupils equal and symmetrical  · Neck  o Inspection/Palpation  o : Trachea is midline, no adenopathy  · Respiratory  o Respiratory Effort  o : Breathing is unlabored.  o Inspection of Chest  o : normal appearance  o Auscultation of Lungs  o : Chest is clear to auscultation bilaterally.  · Cardiovascular  o Heart  o :   § Auscultation of Heart  § : no murmurs, rubs, or gallops  o Peripheral Vascular System  o :   § Extremities  § : no cyanosis, clubbing or edema;   · Gastrointestinal  o Abdominal Examination  o : Abdomen is soft, nontender to palpation, with normal active bowel sounds, no appreciable hepatosplenomegaly.  o Digital Rectal Exam  o : deferred  · Skin and Subcutaneous Tissue  o General Inspection  o : without focal lesions; turgor is normal  · Psychiatric  o General  o : Alert and oriented x3  o Mood and Affect  o : Mood and affect are appropriate to circumstances  · Extremities  o Extremities  o : No edema, no cyanosis          Assessment  · Crohn's disease of both small and large intestine without complication     555.2/K50.80  · Medication monitoring " encounter     V58.83/Z51.81      Plan  · Medications  o Prilosec OTC 20 mg oral tablet,delayed release (DR/EC)   SIG: take 1 tablet by oral route daily   DISP: (90) Tablet with 2 refills  Refilled on 03/02/2021     o Medications have been Reconciled  o Transition of Care or Provider Policy  · Instructions  o Information given on current diagnoses.   · Disposition  o Follow up 6 months            Electronically Signed by: JCARLOS Curran -Author on March 2, 2021 03:00:02 PM

## 2021-05-15 VITALS
SYSTOLIC BLOOD PRESSURE: 134 MMHG | HEIGHT: 64 IN | WEIGHT: 266.12 LBS | BODY MASS INDEX: 45.43 KG/M2 | DIASTOLIC BLOOD PRESSURE: 94 MMHG

## 2021-05-15 VITALS
SYSTOLIC BLOOD PRESSURE: 121 MMHG | DIASTOLIC BLOOD PRESSURE: 72 MMHG | HEIGHT: 76 IN | WEIGHT: 260.12 LBS | BODY MASS INDEX: 31.68 KG/M2

## 2021-05-15 VITALS — RESPIRATION RATE: 14 BRPM | WEIGHT: 266 LBS | HEIGHT: 76 IN | BODY MASS INDEX: 32.39 KG/M2

## 2021-05-15 VITALS — RESPIRATION RATE: 16 BRPM | HEIGHT: 76 IN | WEIGHT: 266.37 LBS | BODY MASS INDEX: 32.44 KG/M2

## 2021-05-15 VITALS — RESPIRATION RATE: 14 BRPM | BODY MASS INDEX: 31.66 KG/M2 | HEIGHT: 76 IN | WEIGHT: 260 LBS

## 2021-05-15 VITALS
WEIGHT: 258.5 LBS | DIASTOLIC BLOOD PRESSURE: 75 MMHG | HEIGHT: 76 IN | BODY MASS INDEX: 31.48 KG/M2 | SYSTOLIC BLOOD PRESSURE: 122 MMHG

## 2021-05-16 VITALS
BODY MASS INDEX: 33.27 KG/M2 | HEIGHT: 76 IN | SYSTOLIC BLOOD PRESSURE: 141 MMHG | DIASTOLIC BLOOD PRESSURE: 86 MMHG | WEIGHT: 273.25 LBS

## 2021-05-16 VITALS
HEIGHT: 76 IN | WEIGHT: 263 LBS | SYSTOLIC BLOOD PRESSURE: 132 MMHG | BODY MASS INDEX: 32.03 KG/M2 | DIASTOLIC BLOOD PRESSURE: 86 MMHG

## 2021-05-18 NOTE — PROGRESS NOTES
Hi Amaya  1980     Office/Outpatient Visit    Visit Date: Mon, Jasen 15, 2020 01:18 pm    Provider: Daniel Corral MD (Assistant: Tresa Estrella MA)    Location: [facility]        Electronically signed by Daniel Corral MD on  06/16/2020 03:15:03 PM                             Subjective:        CC: Alex is a 39 year old White male.  presents today due to back pain         HPI: BP today us 136/75 with a HR of 85.       Pt has pain in mid back, he has sciatic neve pain at time. Feels like a cramp all the time with muscle spasm and electrical jolts when falling asleep. Cramping feeling for at least a month but the jolts of pain started 3 nights ago. Right mid-back and radiates to abdomen, ribs. Worse with laying down, better with heat only while its applied. Not better with ice. He has pain from his neck to buttocks on the right side of his spine.      Nasal and sinus congestion with sinus pressure for the last month and a half. No fever, no cough or shortness of breath. He is not on any allergy meds.     ROS:     CONSTITUTIONAL:  Negative for fatigue and fever.      EYES:  Negative for blurred vision.      E/N/T:  Positive for nasal congestion and sinus pressure.   Negative for diminished hearing.      CARDIOVASCULAR:  Negative for chest pain and palpitations.      RESPIRATORY:  Negative for recent cough and dyspnea.      GASTROINTESTINAL:  Negative for abdominal pain, constipation, diarrhea, nausea and vomiting.      GENITOURINARY:  Negative for dysuria.      MUSCULOSKELETAL:  Positive for back pain ( acute; chronic ).   Negative for arthralgias or myalgias.      INTEGUMENTARY:  Negative for atypical mole(s) and rash.      NEUROLOGICAL:  Negative for paresthesias and weakness.      PSYCHIATRIC:  Negative for anxiety, depression and sleep disturbance.          Past Medical History / Family History / Social History:         Last Reviewed on 6/16/2020 03:12 PM by Daniel Corral  SHANNAN    Past Medical History:             PAST MEDICAL HISTORY         Crohn's Disease: dx'd in ;     HLA B27 +     Hospitalizations: Crohns, admitted once on          CURRENT MEDICAL PROVIDERS:    Gastroenterologist: Dr. Lorena Hendricks    Neurologist: neurosurgeon dr montague         PREVENTIVE HEALTH MAINTENANCE             COLORECTAL CANCER SCREENING: Up to date (colonoscopy q10y; sigmoidoscopy q5y; Cologuard q3y) was last done 18         Surgical History:         Arthroscopy: right shoulder;     Vasectomy: ;         Family History:     Father:  at age 46; Cause of death was Carbon Monoxide poisoning     Mother: Rheumatoid Arthritis     Sister(s): Melanoma     Paternal Grandfather: Coronary Artery Disease         Social History:     Occupation: [employer]     Marital Status:      Children: 3 children         Tobacco/Alcohol/Supplements:     Last Reviewed on 2020 03:12 PM by Daniel Corral    Tobacco: He has a past history of cigarette smoking; quit date:  .  Non-drinker     Caffeine:  He admits to consuming caffeine via soda ( 3 servings per day ).          Substance Abuse History:     Last Reviewed on 2020 03:12 PM by Daniel Corral    None         Mental Health History:     Last Reviewed on 2020 03:12 PM by Daniel Corral        Communicable Diseases (eg STDs):     Last Reviewed on 2020 03:12 PM by Daniel Corral        Current Problems:     Last Reviewed on 2020 03:12 PM by Daniel Corral    Crohn's disease of large intestine without complications    Pure hyperglyceridemia    Encounter for general adult medical examination with abnormal findings    Allergic rhinitis, unspecified    Pain in thoracic spine        Immunizations:     zzFluzone pf-quadrivalent 3 and up 2018    Fluzone Quadrivalent (3+ years) 10/1/2018    Adacel (Tdap) 2013        Allergies:     Last Reviewed on 2020 03:12 PM by Daniel Corral     Amoxicillin:      Percocet:          Current Medications:     Last Reviewed on 6/16/2020 03:12 PM by Daniel Corral    Entyvio 300 mg Intravenous Solution, Reconstituted [Take every two months]    Omeprazole 20 mg oral capsule,delayed release (enteric coated) [1 capsule daily]    Sinus nasal spray         Objective:        Vitals:         Current: 6/15/2020 1:22:48 PM    Ht:  6 ft, 2.5 in;  Wt: 247.8 lbs;  BMI: 31.4T: 98.5 F (oral);  BP: 136/75 mm Hg (left arm, sitting);  P: 85 bpm (left arm (BP Cuff), sitting);  sCr: 0.83 mg/dL;  GFR: 140.60        Exams:     PHYSICAL EXAM:     GENERAL: Vitals recorded well developed, well nourished;     EYES: extraocular movements intact; conjunctiva and cornea are normal; PERRL;     E/N/T: EARS:  normal external auditory canals and tympanic membranes;  grossly normal hearing; OROPHARYNX:  normal mucosa, dentition, gingiva, and posterior pharynx;     NECK: range of motion is normal; thyroid is non-palpable;     RESPIRATORY: normal respiratory rate and pattern with no distress; normal breath sounds with no rales, rhonchi, wheezes or rubs;     CARDIOVASCULAR: normal rate; rhythm is regular;  no systolic murmur;     GASTROINTESTINAL: nontender; normal bowel sounds; no masses;     MUSCULOSKELETAL: normal gait; tone: spastic in right mid back;     NEUROLOGIC: mental status: alert and oriented x 3; GROSSLY INTACT     PSYCHIATRIC: appropriate affect and demeanor; normal psychomotor function;         Assessment:         M54.6   Pain in thoracic spine       J30.9   Allergic rhinitis, unspecified           ORDERS:         Meds Prescribed:       [New Rx] Claritin 10 mg oral tablet [take 1 tablet (10 mg) by oral route once daily], #90 (ninety) tablets, Refills: 3 (three)       [New Rx] Flonase Allergy Relief 50 mcg/actuation Intranasal Spray, Suspension [inhale 1 spray (50 mcg) in each nostril by intranasal route once daily], #16 (sixteen) milliliters, Refills: 1 (one)       [New Rx]  baclofen 10 mg oral tablet [take 1 tablet (10 mg) by oral route 3 times per day as needed], #30 (thirty) tablets, Refills: 0 (zero)         Radiology/Test Orders:       [id number]  Radiologic examination, spine; thoracic, three views  (Send-Out)              Procedures Ordered:       RFPT  Physical/Occupational Therapy Referral  (Send-Out)                      Plan:         Pain in thoracic spinePt seems to have muscle spasm of mid-thoracic spine but given his h/o bulging discs seen on MRI from 2015 this is likely contributing/has worsened. Will get x-ray and referral to PT for now and he will likely need repeat MRI if pain does not improve/resolve.         RADIOLOGY:  I have ordered Thoracic Spine to be done today.      REFERRALS:  Referral initiated to physical therapy ( [facility] ) for evaluation and treatment.            Prescriptions:       [New Rx] baclofen 10 mg oral tablet [take 1 tablet (10 mg) by oral route 3 times per day as needed], #30 (thirty) tablets, Refills: 0 (zero)           Orders:       [id number]  Radiologic examination, spine; thoracic, three views  (Send-Out)            RFPT  Physical/Occupational Therapy Referral  (Send-Out)              Allergic rhinitis, unspecifiedClaritin and flonase are prescribed for chronic allergies.           Prescriptions:       [New Rx] Claritin 10 mg oral tablet [take 1 tablet (10 mg) by oral route once daily], #90 (ninety) tablets, Refills: 3 (three)       [New Rx] Flonase Allergy Relief 50 mcg/actuation Intranasal Spray, Suspension [inhale 1 spray (50 mcg) in each nostril by intranasal route once daily], #16 (sixteen) milliliters, Refills: 1 (one)             Charge Capture:         Primary Diagnosis:     M54.6  Pain in thoracic spine           Orders:      26425  Office/outpatient visit; established patient, level 3  (In-House)              J30.9  Allergic rhinitis, unspecified

## 2021-05-18 NOTE — PROGRESS NOTES
"Hi AmayaMari 1980     Office/Outpatient Visit    Visit Date: Tue, Feb 20, 2018 10:18 am    Provider: Kylie Vela N.P. (Assistant: Pari Mahajan MA)    Location: [facility]        Electronically signed by Kylie Vela N.P. on  02/20/2018 11:25:12 AM                             SUBJECTIVE:        CC:     Alex is a 37 year old White male.  preventive exam         HPI:         HEALTH RISK PROFILE (\"At Risk\" items are starred):         Smoking: ** Past history of tobacco use ( quit 2014 );     Cancer Screening: Performs regular testicular exams;     Immunization Status: Up to date;     Nutrition: Healthy, well-balanced diet;     Physical Activity: Exercises at least 3 times per week;     Alcohol/Drug Use: Rarely drinks alcohol; No illicit drug use;     Safety: Always wears seatbelt;     ROS:     CONSTITUTIONAL:  Negative for chills and fever.      EYES:  Positive for iritis and uses eye drops when needed.   Negative for blurred vision.      E/N/T:  Negative for nasal congestion and sore throat.      CARDIOVASCULAR:  Negative for chest pain and palpitations.      RESPIRATORY:  Negative for recent cough and dyspnea.      GASTROINTESTINAL:  Negative for abdominal pain, nausea and vomiting.      MUSCULOSKELETAL:  Negative for myalgias.      INTEGUMENTARY:  Negative for atypical mole(s) and rash.      NEUROLOGICAL:  Negative for paresthesias and weakness.      PSYCHIATRIC:  Negative for anxiety and depression.          PMH/FMH/SH:     Last Reviewed on 2/20/2018 10:29 AM by Kylie Vela    Past Medical History:             PAST MEDICAL HISTORY         Crohn's Disease: dx'd in 1998;     HLA B27 +     Hospitalizations: Crohns, admitted once on 7-2016         CURRENT MEDICAL PROVIDERS:    Gastroenterologist: Dr. Lorena Hendricks    Neurologist: neurosurgeon dr montague         Surgical History:         Arthroscopy: right shoulder; Procedures: colonoscopy 1998/,  total of 4 last 2010    "  COLONOSCOPY: was last done --3-17-17 Faustino         Family History:     Father:  at age 46; Cause of death was Carbon Monoxide poisoning     Mother: Rheumatoid Arthritis     Sister(s): Healthy; 1 sister(s) total     Paternal Grandfather: Coronary Artery Disease         Social History:     Occupation: [employer]     Marital Status:      Children: 3 children         Tobacco/Alcohol/Supplements:     Last Reviewed on 2018 10:21 AM by Pari Mahajan    Tobacco: He has a past history of cigarette smoking; quit date:  .  Non-drinker     Caffeine:  He admits to consuming caffeine via soda ( 3 servings per day ).          Substance Abuse History:     Last Reviewed on 4/15/2016 03:03 PM by Jack Spicer             Immunizations:     Adacel (Tdap) 2013         Allergies:     Last Reviewed on 2018 10:20 AM by Pari Mahajan    Amoxicillin:    Percocet:        Current Medications:     Last Reviewed on 2018 11:23 AM by Kylie Vela    Ferrous Sulfate 325mg Tablets 1 TAB DAILY     vitamin d 1000iu daily     Entyvio 300mg Powder for Injection Take every two months     Omeprazole 20mg Capsules, Extended Release 1 capsule daily         OBJECTIVE:        Vitals:         Current: 2018 10:20:15 AM    Ht:  6 ft, 2.5 in;  Wt: 268.1 lbs;  BMI: 34.0    T: 97.1 F (oral);  BP: 138/96 mm Hg (left arm, sitting);  P: 89 bpm (left arm (BP Cuff), sitting);  sCr: 0.67 mg/dL;  GFR: 183.52        Repeat:     10:42:17 AM     BP:   128/80mm Hg (left arm, sitting)         Exams:     PHYSICAL EXAM:     GENERAL: Vitals recorded well developed, well nourished;  well groomed;  no apparent distress;     EYES: lids and lacrimal system are normal in appearance; extraocular movements intact; conjunctiva and cornea are normal; PERRLA;     E/N/T:  normal EACs, TMs, nasal/oral mucosa, teeth, gingiva, and oropharynx;     NECK:  supple, full ROM; no thyromegaly; no carotid bruits;      RESPIRATORY: normal respiratory rate and pattern with no distress; normal breath sounds with no rales, rhonchi, wheezes or rubs;     CARDIOVASCULAR: normal rate; rhythm is regular;  no systolic murmur; no edema;     GASTROINTESTINAL: nontender, nondistended; no hepatosplenomegaly or masses; no bruits; mild LUQ tenderness;  no guarding;  no rebound tenderness;  normal bowel sounds; no organomegaly;     LYMPHATIC: no enlargement of cervical or facial nodes;     SKIN:  no significant rashes or lesions; no suspicious moles;     MUSCULOSKELETAL:  Normal range of motion, strength and tone;     NEUROLOGIC: GROSSLY INTACT     PSYCHIATRIC:  appropriate affect and demeanor; normal speech pattern; grossly normal memory;         Procedures:     Vaccination against other viral diseases, Influenza     1. Influenza, seasonal PF (children 3 years to adult): 0.5 ml unit dose given IM in the left upper arm; administered by ;  lot number [id number]; expires [id number] Regarding contraindications to an Influenza vaccine: Denies moderate/severe illness with/without fever; serious reaction to eggs, egg proteins, gentamicin, gelatin, arginine, neomycin or polymixin; serious reaction after recieving previous influenza vaccines; and history of Guillain-Crawley Syndrome.              ASSESSMENT           V70.0   Z00.01  Annual physical              DDx:     V04.81   Z23  Vaccination against other viral diseases, Influenza              DDx:     555.1   K50.10  Crohn's disease of colon              DDx:         ORDERS:         Procedures Ordered:       79743  Immunization administration; one vaccine  (In-House)           Other Orders:       20100  Influenza virus vaccine, quadrivalent, split virus, preservative free 3 years of age & older  (In-House)                   PLAN:          Annual physical 2014 last cholesterol screen / normal and he states his insurance does not require that to be tested but every 5 years         COUNSELING was provided  today regarding the following topics: healthy eating habits, regular exercise, testicular self-exam, and use of seat belts.      FOLLOW-UP: Schedule follow-up appointments on a p.r.n. basis.            Patient Education Handouts:       Physical Exam 30-39 year, Male           Vaccination against other viral diseases, Influenza           Orders:       62106  Immunization administration; one vaccine  (In-House)         97769  Influenza virus vaccine, quadrivalent, split virus, preservative free 3 years of age & older  (In-House)            Crohn's disease of colon has a follow up EGD/colon screening tomorrow with dr Lorena Hendricks             Patient Recommendations:        For  Annual physical:     Limit dietary intake of fat (especially saturated fat) and cholesterol.  Eat a variety of foods, including plenty of fruits, vegetables, and grain containg fiber, limit fat intake to 30% of total calories. Balance caloric intake with energy expended. Maintaining regular physical activity is advised to help prevent heart disease, hypertension, diabetes, and obesity.    Testicular cancer is the #1 cancer in men ages 15-35.  You should regularly examine your testicles for knots, lumps, or tenderness. Testicular pain, even if not associated with any masses, needs to be evaluated by your doctor! Always use shoulder/lap restraints when driving or riding in a vehicle, even those equipped with air bags.  Schedule follow-up appointments as needed.              CHARGE CAPTURE           **Please note: ICD descriptions below are intended for billing purposes only and may not represent clinical diagnoses**        Primary Diagnosis:         V70.0 Annual physical            Z00.01    Encounter for general adult medical examination with abnormal findings              Orders:          50624   Preventive medicine, established patient, age 18-39 years  (In-House)           V04.81 Vaccination against other viral diseases, Influenza             Z23    Encounter for immunization              Orders:          15091   Immunization administration; one vaccine  (In-House)             91003   Influenza virus vaccine, quadrivalent, split virus, preservative free 3 years of age & older  (In-House)           555.1 Crohn's disease of colon            K50.10    Crohn's disease of large intestine without complications        ADDENDUMS:      ____________________________________    Addendum: 01/18/2019 01:25 PM - Kayce Calderon         Visit Note Faxed to:        Lorena Hendricks  (Gastroenterology); Number [phone]

## 2021-05-18 NOTE — PROGRESS NOTES
Hi Amaya ROEL  1980     Office/Outpatient Visit    Visit Date: Mon, Dec 2, 2019 10:00 am    Provider: Guille Peña MD (Assistant: Bina Ruggiero MA)    Location: [facility]        Electronically signed by Guille Peña MD on  12/02/2019 10:45:55 AM                             Subjective:        CC: Alex is a 39 year old White male.  last monday, fever, sore throat, cough, drainage, red eyes since friday         HPI:           Alex presents with acute upper respiratory infection, unspecified.  These have been present for the past 7 days.  The symptoms include body aches, Chills, cough, ear congestion,  fever, nasal discharge, diarrhea, nausea, vomiting and red eyes.  He denies chest congestion, nasal congestion or sinus pain/pressure.  He denies exposure to ill contacts.  He has already tried to relieve the symptoms with acetaminophen,  Delsym cough medication, ibuprofen, and zofran.  Medical history is significant for frequent pharyngitis and Crohns.      ROS:     CONSTITUTIONAL:  Positive for chills and fever.   Negative for fatigue.      EYES:  Positive for red eyes.   Negative for blurred vision, eye drainage or eye pain.      E/N/T:  Positive for ear pain ( bilateral ) and frequent rhinorrhea.   Negative for tinnitus, nasal congestion, hoarseness, sinus pressure or sore throat.      CARDIOVASCULAR:  Negative for chest pain, dizziness, palpitations and edema.      RESPIRATORY:  Positive for cough.   Negative for dyspnea.      GASTROINTESTINAL:  Positive for diarrhea, nausea and vomiting.   Negative for abdominal pain.      MUSCULOSKELETAL:  Positive for myalgias.   Negative for arthralgias.      INTEGUMENTARY:  Negative for rash.      NEUROLOGICAL:  Positive for headaches.   Negative for paresthesias or weakness.          Past Medical History / Family History / Social History:         Last Reviewed on 12/02/2019 10:45 AM by Guille Peña    Past Medical History:             PAST  MEDICAL HISTORY         Crohn's Disease: dx'd in ;     HLA B27 +     Hospitalizations: Crohns, admitted once on          CURRENT MEDICAL PROVIDERS:    Gastroenterologist: Dr. Lorena Hendricks    Neurologist: neurosurgeon dr montague         PREVENTIVE HEALTH MAINTENANCE             COLORECTAL CANCER SCREENING: Up to date (colonoscopy q10y; sigmoidoscopy q5y; Cologuard q3y) was last done 18         Surgical History:         Arthroscopy: right shoulder; Procedures: colonoscopy 1998/10-,  total of 4 last      Vasectomy: ;     Procedures:    EGD ( 18 )     COLONOSCOPY: was last done --3-17-17 & 18 with the following abnormalaties noted-- polyps Eladio         Family History:     Father:  at age 46; Cause of death was Carbon Monoxide poisoning     Mother: Rheumatoid Arthritis     Sister(s): Healthy; 1 sister(s) total     Paternal Grandfather: Coronary Artery Disease         Social History:     Occupation: [employer]     Marital Status:      Children: 3 children         Tobacco/Alcohol/Supplements:     Last Reviewed on 2019 10:45 AM by Guille Peña    Tobacco: He has a past history of cigarette smoking; quit date:  .  Non-drinker     Caffeine:  He admits to consuming caffeine via soda ( 3 servings per day ).          Substance Abuse History:     Last Reviewed on 2019 10:45 AM by Guille Peña    None         Mental Health History:     Last Reviewed on 2019 10:45 AM by Guille Peña        Communicable Diseases (eg STDs):     Last Reviewed on 2019 10:45 AM by Guille Peña        Current Problems:     Last Reviewed on 2019 10:45 AM by Guille Peña    Crohn's disease of large intestine without complications    Crohn's disease of colon    Elevated blood-pressure reading, without diagnosis of hypertension    Abnormal weight gain    Abnormal weight gain    Elevated blood pressure without a diagnosis of hypertension     "Conjunctival hyperemia, bilateral    Acute pharyngitis, unspecified    Acute upper respiratory infection, unspecified    Fever, unspecified        Immunizations:     zzFluzone pf-quadrivalent 3 and up 2/20/2018    Fluzone Quadrivalent (3+ years) 10/1/2018    Adacel (Tdap) 7/23/2013        Allergies:     Last Reviewed on 12/02/2019 10:45 AM by Guille Peña    Amoxicillin:      Percocet:          Current Medications:     Last Reviewed on 12/02/2019 10:45 AM by Guille Peña    Entyvio 300mg Powder for Injection [Take every two months]    Omeprazole 20mg Capsules, Extended Release [1 capsule daily]        Objective:        Vitals:         Current: 12/2/2019 10:08:53 AM    Ht:  6 ft, 2.5 in;  Wt: 270 lbs;  BMI: 34.2T: 98.5 F (oral);  BP: 127/85 mm Hg (right arm, sitting);  P: 82 bpm (right arm (BP Cuff), sitting);  sCr: 0.83 mg/dL;  GFR: 145.82        Exams:     PHYSICAL EXAM:     GENERAL: Vitals recorded well developed, well nourished;  no apparent distress;     EYES: hyperemic conjunctiva;     E/N/T: EARS: both TMs are red;  NOSE: nasal mucosa is erythematous;  no sinus tenderness; OROPHARYNX: posterior pharynx shows 2+ tonsils, no exudate, and erythematous tonsils;     NECK: trachea is midline; thyroid is non-palpable;     RESPIRATORY: Clear to auscultation bilateally; no rales (\"crackles\") present; no rhonchi; no wheezes;     CARDIOVASCULAR: normal rate; rhythm is regular;  No murmurs, clicks, gallops or rubs appreciated; no edema;     GASTROINTESTINAL: nontender; Soft and nondistended; normal bowel sounds; no organomegaly; no masses;     LYMPHATIC: bilateral anterior cervical nodes;  no supraclavicular nodes;     SKIN:  No significant rashes, lesions or suspicious moles within limits of examination;     NEUROLOGIC: Grossly intact; mental status: alert and oriented x 3;     PSYCHIATRIC: appropriate affect and demeanor; normal speech pattern; Normal behavior;         Lab/Test Results:         Influenza A and B: " Negative (12/02/2019),     Performed by:: [facility] (12/02/2019),     Rapid Strep Screen: Negative (12/02/2019),             Assessment:         J06.9   Acute upper respiratory infection, unspecified         DDx: - Nonspecific viral URI vs strep vs influenza vs IM vs sinusitis    H11.433   Conjunctival hyperemia, bilateral         DDx: - Viral conjunctivitis vs viral hyperemia vs iritis (hx of UC)    R50.9   Fever, unspecified           ORDERS:         Lab Orders:       [id number]  Group A Streptococcus detection by immunoassay with direct optical observation  (In-House)            00158-67  Infectious agent antigen detection by immunoassay; Influenza  (In-House)            02635  Infectious agent antigen detection by immunoassay; Influenza  (In-House)            49119  [facility] Throat culture, strep  (Send-Out)                      Plan:         Acute upper respiratory infection, unspecified- Rapid flu and strep tests negative in office today. Throat swab sample sent for culture. Will treat supportively at this time. Chloraseptic/cepacol for throat pain. Tylenol for fever/discomfort. OTC decongestants/cough suppressants as needed. Advised good hydration and rest. If sx persist beyond 10 days, will consider abx. ED/return precautions given.           Orders:       [id number]  Group A Streptococcus detection by immunoassay with direct optical observation  (In-House)            00137  [facility] Throat culture, strep  (Send-Out)            84115-04  Infectious agent antigen detection by immunoassay; Influenza  (In-House)            92592  Infectious agent antigen detection by immunoassay; Influenza  (In-House)              Conjunctival hyperemia, bilateral- In addition to above plan, patient has been advised to seek an evaluation from his eye doctor given his history of iritis 2/2 crohns.             Charge Capture:         Primary Diagnosis:     J06.9  Acute upper respiratory infection, unspecified           Orders:      35312   Office/outpatient visit; established patient, level 4  (In-House)            80231  Group A Streptococcus detection by immunoassay with direct optical observation  (In-House)            23707-20  Infectious agent antigen detection by immunoassay; Influenza  (In-House)            35131  Infectious agent antigen detection by immunoassay; Influenza  (In-House)              H11.433  Conjunctival hyperemia, bilateral     R50.9  Fever, unspecified

## 2021-05-18 NOTE — PROGRESS NOTES
Shamekacarlos aHi 1980     Office/Outpatient Visit    Visit Date: Thu, Oct 3, 2019 01:04 pm    Provider: Filiberto Saucedo MD (Assistant: Rizwana Lance RN)    Location: [facility]        Electronically signed by Filiberto Saucedo MD on  10/04/2019 05:32:48 AM                             SUBJECTIVE:        CC: elevated blood pressure         HPI:     Alex is in today for follow up on blood pressure.  He does have Crohn's disease and does have Entyvio infusion pretty regularly.  His most recent blood pressure there was moderately elevated.  It was in the 150s over 100s.  He attributed that to stress.  However, he has been monitoring it and noted that the pressure is high.  He did check it at a family member's and noted that the pressure was 170/120.  He is not sure what is contributing to this.  It is better today and was better over the weekend.         He has been having some ongoing issue with weight gain.  He did have mostly normal labs in July, but he did not have the thyroid checked at that time.  He feels fine.      ROS:     CONSTITUTIONAL:  Negative for chills and fever.      CARDIOVASCULAR:  Negative for chest pain and palpitations.      RESPIRATORY:  Negative for recent cough and dyspnea.      GASTROINTESTINAL:  Negative for abdominal pain, nausea and vomiting.      INTEGUMENTARY:  Negative for atypical mole(s) and rash.          PMH/FMH/SH:     Last Reviewed on 10/03/2019 01:29 PM by Filiberto Saucedo    Past Medical History:             PAST MEDICAL HISTORY         Crohn's Disease: dx'd in 1998;     HLA B27 +     Hospitalizations: Crohns, admitted once on 7-2016         CURRENT MEDICAL PROVIDERS:    Gastroenterologist: Dr. Lorena Hendricks    Neurologist: neurosurgeon dr montague         PREVENTIVE HEALTH MAINTENANCE             COLORECTAL CANCER SCREENING: Up to date (colonoscopy q10y; sigmoidoscopy q5y; Cologuard q3y) was last done 2-21-18         Surgical History:         Arthroscopy:  right shoulder; Procedures: colonoscopy 1998/10-,  total of 4 last      Procedures:    EGD ( 18 )     COLONOSCOPY: was last done --3-17-17 & 18 with the following abnormalaties noted-- polyps Hendricks         Family History:     Father:  at age 46; Cause of death was Carbon Monoxide poisoning     Mother: Rheumatoid Arthritis     Sister(s): Healthy; 1 sister(s) total     Paternal Grandfather: Coronary Artery Disease         Social History:     Occupation: [employer]     Marital Status:      Children: 3 children         Tobacco/Alcohol/Supplements:     Last Reviewed on 10/03/2019 01:29 PM by Filiberto Saucedo    Tobacco: He has a past history of cigarette smoking; quit date:  .  Non-drinker     Caffeine:  He admits to consuming caffeine via soda ( 3 servings per day ).          Substance Abuse History:     Last Reviewed on 10/03/2019 01:29 PM by Filiberto Saucedo    None         Mental Health History:     Last Reviewed on 10/03/2019 01:29 PM by Filiberto Saucedo        Communicable Diseases (eg STDs):     Last Reviewed on 10/03/2019 01:29 PM by Filiberto Saucedo            Current Problems:     Last Reviewed on 10/03/2019 01:29 PM by Filiberto Saucedo    Crohn's disease of colon     Abnormal weight gain     Elevated blood pressure without a diagnosis of hypertension         Immunizations:     zzFluzone pf-quadrivalent 3 and up 2018     Fluzone Quadrivalent (3+ years) 10/1/2018     Adacel (Tdap) 2013         Allergies:     Last Reviewed on 10/03/2019 01:29 PM by Filiberto Saucedo    Amoxicillin:    Percocet:        Current Medications:     Last Reviewed on 10/03/2019 01:29 PM by Filiberto Saucedo    Entyvio 300mg Powder for Injection Take every two months     Omeprazole 20mg Capsules, Extended Release 1 capsule daily         OBJECTIVE:        Vitals:         Current: 10/3/2019 1:09:00 PM    Ht:  6 ft, 2.5 in;  Wt: 274.2 lbs;   BMI: 34.7    T: 97.8 F (oral);  BP: 133/77 mm Hg (right arm, sitting);  P: 79 bpm (right arm (BP Cuff), sitting);  sCr: 0.67 mg/dL;  GFR: 183.56        Exams:     PHYSICAL EXAM:     GENERAL: Vitals recorded well developed,  moderately obese;     NECK: range of motion is normal; thyroid is non-palpable;     RESPIRATORY: normal respiratory rate and pattern with no distress; normal breath sounds with no rales, rhonchi, wheezes or rubs;     CARDIOVASCULAR: normal rate; rhythm is regular;  no systolic murmur;     GASTROINTESTINAL: nontender; normal bowel sounds; no masses;     LYMPHATIC: no enlargement of cervical or facial nodes; no supraclavicular nodes;     SKIN:  no significant rashes or lesions; no suspicious moles;     NEUROLOGIC: mental status: alert and oriented x 3; cranial nerves II-XII grossly intact;     PSYCHIATRIC: appropriate affect and demeanor; normal psychomotor function;         ASSESSMENT           796.2   R03.0  Elevated blood pressure without a diagnosis of hypertension              DDx:     783.1   R63.5  Abnormal weight gain              DDx:         ORDERS:         Lab Orders:       [id number]  [facility] Lipid Panel  (Send-Out)         08988  TSH - [facility] TSH  (Send-Out)                   PLAN:          Elevated blood pressure without a diagnosis of hypertension         RECOMMENDATIONS given include: Today, we have reviewed his care.  He is concerned about the blood pressure.  I don't know yet whether he may need medication for the blood pressure.  However, I am concerned about the weight gain that we have seen.  I am going to recommend he try to limit salt intake.  Additionally, I would strongly recommend he decrease sugar intake in his diet immediately.  I'd like to see his weight drop some in the next few months.  I did ask him to use our free blood pressure check on 10/26 and 11/23 if possible.  We will update a couple of labs as noted.  No other changes for now..            Patient Education  Handouts:       High Blood Pressure (HTN)           Abnormal weight gain     LABORATORY:  Labs ordered to be performed today include lipid panel and TSH.            Orders:       [id number]  LPDP - [facility] Lipid Panel  (Send-Out)         22674  TSH - [facility] TSH  (Send-Out)               CHARGE CAPTURE           **Please note: ICD descriptions below are intended for billing purposes only and may not represent clinical diagnoses**        Primary Diagnosis:         796.2 Elevated blood pressure without a diagnosis of hypertension            R03.0    Elevated blood-pressure reading, without diagnosis of hypertension              Orders:          90670   Office/outpatient visit; established patient, level 4  (In-House)           783.1 Abnormal weight gain            R63.5    Abnormal weight gain

## 2021-05-18 NOTE — PROGRESS NOTES
Hi Amaya 1980     Office/Outpatient Visit    Visit Date: Mon, Jan 28, 2019 02:22 pm    Provider: Re Garcia N.P. (Assistant: Angela Ya MA)    Location: [facility]        Electronically signed by Re Garcia N.P. on  01/28/2019 03:05:28 PM                             SUBJECTIVE:        CC:     Alex is a 38 year old White male.  This is a follow-up visit.  Patient states he went to the [facility] 1/21/19 and was dx with strep throat, patient states he was given a z-sukumar. He has finished it but states he feels no better         HPI:         Alex presents with upper respiratory illness.  These have been present for the past one week.  The symptoms include Chills, ear complaints, subjective fever and sore throat.  He has already tried to relieve the symptoms with acetaminophen and Z-pack.  Had positive RSS at [facility] on 1/21/19.     ROS:     CONSTITUTIONAL:  Positive for chills and fever.      EYES:  Negative for blurred vision and eye drainage.      E/N/T:  Positive for sore throat.   Negative for ear pain.      CARDIOVASCULAR:  Negative for chest pain and palpitations.      RESPIRATORY:  Negative for dyspnea and frequent wheezing.          [facility]/:     Last Reviewed on 2/20/2018 10:29 AM by Kylie Vela    Past Medical History:             PAST MEDICAL HISTORY         Crohn's Disease: dx'd in 1998;     HLA B27 +     Hospitalizations: Crohns, admitted once on 7-2016         CURRENT MEDICAL PROVIDERS:    Gastroenterologist: Dr. Lorena Hendricks    Neurologist: neurosurgeon dr montague         PREVENTIVE HEALTH MAINTENANCE             COLORECTAL CANCER SCREENING: Up to date (colonoscopy q10y; sigmoidoscopy q5y; Cologuard q3y) was last done 2-21-18         Surgical History:         Arthroscopy: right shoulder; Procedures: colonoscopy 1998/,  total of 4 last 2010     Procedures:    EGD ( 2-21-18 )     COLONOSCOPY: was last done --3-17-17 & 2-21-18 with the  following abnormalaties noted-- polyps Hendricks         Family History:     Father:  at age 46; Cause of death was Carbon Monoxide poisoning     Mother: Rheumatoid Arthritis     Sister(s): Healthy; 1 sister(s) total     Paternal Grandfather: Coronary Artery Disease         Social History:     Occupation: [employer]     Marital Status:      Children: 3 children         Tobacco/Alcohol/Supplements:     Last Reviewed on 2018 10:21 AM by Pari Mahajan    Tobacco: He has a past history of cigarette smoking; quit date:  .  Non-drinker     Caffeine:  He admits to consuming caffeine via soda ( 3 servings per day ).          Substance Abuse History:     Last Reviewed on 4/15/2016 03:03 PM by Jack Spicer    None             Current Problems:     Last Reviewed on 2016 04:17 PM by Jack Spicer    Crohn's disease of colon         Immunizations:     zzFluzone pf-quadrivalent 3 and up 2018     Adacel (Tdap) 2013         Allergies:     Last Reviewed on 2018 10:20 AM by Pari Mahajan    Amoxicillin:    Percocet:        Current Medications:     Last Reviewed on 2018 11:23 AM by Kylie Vela 300mg Powder for Injection Take every two months     Omeprazole 20mg Capsules, Extended Release 1 capsule daily     Ferrous Sulfate 325mg Tablets 1 TAB DAILY     vitamin d 1000iu daily         OBJECTIVE:        Vitals:         Current: 2019 2:30:05 PM    Ht:  6 ft, 2.5 in;  Wt: 244.6 lbs;  BMI: 31.0    T: 98.9 F (oral);  BP: 118/72 mm Hg (left arm, sitting);  P: 85 bpm (left arm (BP Cuff), sitting);  sCr: 0.67 mg/dL;  GFR: 174.86        Exams:     PHYSICAL EXAM:     GENERAL: well developed, well nourished;  no apparent distress;     EYES: PERRL, EOMI     E/N/T: EARS: external auditory canal normal;  both TMs are dull;  NOSE: normal turbinates; no sinus tenderness; OROPHARYNX: oral mucosa is normal; posterior pharynx shows no exudate and  erythema;     NECK: range of motion is normal; trachea is midline;     RESPIRATORY: normal respiratory rate and pattern with no distress; normal breath sounds with no rales, rhonchi, wheezes or rubs;     CARDIOVASCULAR: normal rate; rhythm is regular;     MUSCULOSKELETAL: normal gait;     NEUROLOGIC: mental status: alert and oriented x 3; GROSSLY INTACT     PSYCHIATRIC: appropriate affect and demeanor;         ASSESSMENT           034.0   J03.00  Streptococcal sore throat              DDx:         ORDERS:         Meds Prescribed:       Cefdinir 300mg Capsules 1 bid for 10 days  #20 (Twenty) capsule(s) Refills: 0                 PLAN:          Streptococcal sore throat Cepacol lozenges, Chloraseptic spray. Ibuprofen or Tylenol per package instructions. Change toothbrush after being on antibiotics for 48 hours to prevent reinfection.         RECOMMENDATIONS given include: Push Fluids, Rest, Follow up if no improvement or worsening symptoms like high fevers, vomiting, weakness, or increasing shortness of air.    .      FOLLOW-UP: Schedule follow-up appointments on a p.r.n. basis. Chronic visit follow up           Prescriptions:       Cefdinir 300mg Capsules 1 bid for 10 days  #20 (Twenty) capsule(s) Refills: 0             Patient Recommendations:        For  Streptococcal sore throat:     Schedule follow-up appointments as needed.              CHARGE CAPTURE           **Please note: ICD descriptions below are intended for billing purposes only and may not represent clinical diagnoses**        Primary Diagnosis:         034.0 Streptococcal sore throat            J03.00    Acute streptococcal tonsillitis, unspecified              Orders:          64260   Office/outpatient visit; established patient, level 3  (In-House)

## 2021-05-18 NOTE — PROGRESS NOTES
"Hi Amaya  1980     Office/Outpatient Visit    Visit Date: Tue, Sep 8, 2020 08:52 am    Provider: Daniel Corral MD (Assistant: Dioni Adan, )    Location: [facility]        Electronically signed by Daniel Corral MD on  09/08/2020 05:50:32 PM                             Subjective:        CC: Alex is a 39 year old White male.  still having back pain (NOT TAKING FLONASE OR CLARITIN OR BACLOFEN OR NORCO)        HPI:           PHQ-9 Depression Screening: Completed form scanned and in chart; Total Score 7       Pt continues to have mid-back pain, worse at night. He saw GI last Wednesday and she said it may be a side effect of entyvio and he would benefit from flexeril. He had no benefit from baclofen in June. Norco helped but made him feel \"drunk\". His main priority is to sleep better. He did PT from 6/19 until late August (twice a week for 8 week approx). This helped only briefly after PT sessions but then pain would return. They did several different interventions and each seemed to help only briefly (ice, traction, stretching). Pt has pain in mid back, more right sided, feels like a cramp all the time with muscle spasm and electrical jolts when falling asleep. Radiates to lower chest/ribs. He has pain from his neck to buttocks on the right side of his spine. Normal thoracic spine x-ray on 6/15/20. MRI thoracic spine on 12/9/15 showed 2 tiny disc protrusions of thoracic spine but no canal stenosis.    ROS:     CONSTITUTIONAL:  Negative for fatigue and fever.      EYES:  Negative for blurred vision.      E/N/T:  Positive for nasal congestion and sinus pressure.   Negative for diminished hearing.      CARDIOVASCULAR:  Negative for chest pain and palpitations.      RESPIRATORY:  Negative for recent cough and dyspnea.      GASTROINTESTINAL:  Negative for abdominal pain, constipation, diarrhea, nausea and vomiting.      GENITOURINARY:  Negative for dysuria.      MUSCULOSKELETAL:  " Positive for back pain ( acute; chronic ) and joint stiffness (back).   Negative for arthralgias or myalgias.      INTEGUMENTARY:  Negative for atypical mole(s) and rash.      NEUROLOGICAL:  Negative for paresthesias and weakness.      PSYCHIATRIC:  Negative for anxiety, depression and sleep disturbance.          Past Medical History / Family History / Social History:         Last Reviewed on 2020 05:46 PM by Daniel Corral    Past Medical History:             PAST MEDICAL HISTORY         Crohn's Disease: dx'd in ;     HLA B27 +     Hospitalizations: Crohns, admitted once on          CURRENT MEDICAL PROVIDERS:    Gastroenterologist: Dr. Lorena Hendricks    Neurologist: neurosurgeon dr montague         PREVENTIVE HEALTH MAINTENANCE             COLORECTAL CANCER SCREENING: Up to date (colonoscopy q10y; sigmoidoscopy q5y; Cologuard q3y) was last done 18         Surgical History:         Arthroscopy: right shoulder;     Vasectomy: ;         Family History:     Father:  at age 46; Cause of death was Carbon Monoxide poisoning     Mother: Rheumatoid Arthritis     Sister(s): Melanoma     Paternal Grandfather: Coronary Artery Disease         Social History:     Occupation: [employer]     Marital Status:      Children: 3 children         Tobacco/Alcohol/Supplements:     Last Reviewed on 2020 05:46 PM by Daniel Corral    Tobacco: He has a past history of cigarette smoking; quit date:  .  Non-drinker     Caffeine:  He admits to consuming caffeine via soda ( 3 servings per day ).          Substance Abuse History:     Last Reviewed on 2020 05:46 PM by Daniel Corral    None         Mental Health History:     Last Reviewed on 2020 05:46 PM by Daniel Corral        Communicable Diseases (eg STDs):     Last Reviewed on 2020 05:46 PM by Daniel Corral        Current Problems:     Last Reviewed on 2020 05:46 PM by Daniel Corral    Crohn's  disease of large intestine without complications    Pure hyperglyceridemia    Encounter for general adult medical examination with abnormal findings    Allergic rhinitis, unspecified    Pain in thoracic spine    Encounter for general adult medical examination without abnormal findings    Encounter for screening for depression    Other long term (current) drug therapy        Immunizations:     zzFluzone pf-quadrivalent 3 and up 2/20/2018    Fluzone Quadrivalent (3+ years) 10/1/2018    Adacel (Tdap) 7/23/2013        Allergies:     Last Reviewed on 9/08/2020 05:46 PM by Daniel Corral    Amoxicillin:      Percocet:          Current Medications:     Last Reviewed on 9/08/2020 05:46 PM by Daniel Corral    Sinus nasal spray     Entyvio 300 mg Intravenous Solution, Reconstituted [Take every two months]    Omeprazole 20 mg oral capsule,delayed release (enteric coated) [1 capsule daily]    Claritin 10 mg oral tablet [take 1 tablet (10 mg) by oral route once daily]    Flonase Allergy Relief 50 mcg/actuation Intranasal Spray, Suspension [inhale 1 spray (50 mcg) in each nostril by intranasal route once daily]    baclofen 10 mg oral tablet [take 1 tablet (10 mg) by oral route 3 times per day as needed]    Norco 7.5-325 mg oral tablet [take 1 tablet by oral route every 6 hours as needed for pain]        Objective:        Vitals:         Current: 9/8/2020 8:58:12 AM    Ht:  6 ft, 2.5 in;  Wt: 236 lbs;  BMI: 29.9T: 97.5 F (temporal);  BP: 124/79 mm Hg (left arm, sitting);  P: 89 bpm (left arm (BP Cuff), sitting);  sCr: 0.74 mg/dL;  GFR: 154.46        Exams:     PHYSICAL EXAM:     GENERAL: Vitals recorded well developed, well nourished;  well groomed;  no apparent distress;     EYES: extraocular movements intact; conjunctiva and cornea are normal; PERRL;     E/N/T: OROPHARYNX:  normal mucosa, dentition, gingiva, and posterior pharynx;     NECK: range of motion is normal; thyroid is non-palpable;     RESPIRATORY: normal  respiratory rate and pattern with no distress; normal breath sounds with no rales, rhonchi, wheezes or rubs;     CARDIOVASCULAR: normal rate; rhythm is regular;  no systolic murmur;     GASTROINTESTINAL: nontender;     MUSCULOSKELETAL: normal gait; tone: spastic in right mid back; mild TTP right mid back;     NEUROLOGIC: mental status: alert and oriented x 3; GROSSLY INTACT     PSYCHIATRIC: appropriate affect and demeanor; normal psychomotor function;         Lab/Test Results:         Urine temperature: confirmed (09/08/2020),     All urine drug screen levels confirmed negative: yes (09/08/2020),     Date and time of last pill: Tramadol 9/3/20 & Norco about a week ago./KG (09/08/2020),     Performed by: tls (09/08/2020),     Collection Time: 0935 (09/08/2020),             Assessment:         Z13.31   Encounter for screening for depression       M54.6   Pain in thoracic spine       Z79.899   Other long term (current) drug therapy           ORDERS:         Meds Prescribed:       [New Rx] cyclobenzaprine 10 mg oral tablet [take 1 tablet (10 mg) by oral route qHS ], #30 (thirty) tablets, Refills: 2 (two)         Radiology/Test Orders:       [id number]  Magnetic resonance imaging, spinal canal and contents, thoracic; without contrast  (Send-Out)              Lab Orders:       [id number]  Drug test prsmv read direct optical obs pr date  (In-House)              Procedures Ordered:       REFER  Referral to Specialist or Other Facility  (Send-Out)                      Plan:         Encounter for screening for depression    MIPS PHQ-9 Depression Screening: Completed form scanned and in chart; Total Score 7         Pain in thoracic spinePt completed 8 weeks of PT (16 sessions) without any significant benefit. He has a h/o bulging discs on MRI from 5 years ago and he is HLA B27 +. Given morning stiffness and elevated CRP last week this is concerning for ankylosing spondylitis. MRI Thoracic spine is ordered and he is referred to  rheumatology. I will prescribe flexeril to help him sleep and norco can be prescribed qHS if needed.         RADIOLOGY:  I have ordered MRI Thoracic Spine w/o contrast to be done today.      REFERRALS:  Referral initiated to a rheumatologist ( at [facility] ).            Prescriptions:       [New Rx] cyclobenzaprine 10 mg oral tablet [take 1 tablet (10 mg) by oral route qHS ], #30 (thirty) tablets, Refills: 2 (two)           Orders:       REFER  Referral to Specialist or Other Facility  (Send-Out)            [id number]  Magnetic resonance imaging, spinal canal and contents, thoracic; without contrast  (Send-Out)              Other long term (current) drug therapy          Orders:       [id number]  Drug test prsmv read direct optical obs pr date  (In-House)                  Charge Capture:         Primary Diagnosis:     Z13.31  Encounter for screening for depression           Orders:      40504  Office/outpatient visit; established patient, level 4  (In-House)              M54.6  Pain in thoracic spine     Z79.899  Other long term (current) drug therapy           Orders:      31888  Drug test prsmv read direct optical obs pr date  (In-House)

## 2021-05-18 NOTE — PROGRESS NOTES
"Hi AmayaMari 1980     Office/Outpatient Visit    Visit Date: Mon, Feb 25, 2019 01:06 pm    Provider: Kylie Vela N.P. (Assistant: Floridalma Vela MA)    Location: [facility]        Electronically signed by Kylie Vela N.P. on  02/25/2019 02:31:02 PM                             SUBJECTIVE:        CC:     Alex is a 38 year old White male.  Patient is here for yearly physical exam.          HPI:         Patient to be evaluated for health checkup.  HEALTH RISK PROFILE (\"At Risk\" items are starred):         Smoking: ** Past history of tobacco use ( quit 2014 );     Immunization Status: Up to date;     Nutrition: Healthy, well-balanced diet;     Physical Activity: Exercises at least 3 times per week;     Alcohol/Drug Use: Is a non-drinker; No illicit drug use;     Safety: Always wears seatbelt;         PHQ-9 Depression Screening: Completed form scanned and in chart; Total Score 0 Alcohol Consumption Screening: Completed form scanned and in chart; Total Score 0     ROS:     CONSTITUTIONAL:  Negative for chills and fever.      EYES:  Negative for blurred vision.      E/N/T:  Positive for nasal congestion.   Negative for sore throat.      CARDIOVASCULAR:  Negative for chest pain and palpitations.      RESPIRATORY:  Negative for recent cough and dyspnea.      GASTROINTESTINAL:  Negative for abdominal pain, nausea and vomiting.      GENITOURINARY:  Positive for requested referral for a vasectomy.      MUSCULOSKELETAL:  Negative for myalgias.      INTEGUMENTARY:  Positive for skin lesions on face increasing in size over last 6 months.   Negative for rash.      NEUROLOGICAL:  Negative for paresthesias and weakness.      PSYCHIATRIC:  Negative for anxiety and depression.          PMH/FMH/SH:     Last Reviewed on 2/25/2019 01:22 PM by Kylie Vela    Past Medical History:             PAST MEDICAL HISTORY         Crohn's Disease: dx'd in 1998;     HLA B27 +     Hospitalizations: Crohns, " admitted once on          CURRENT MEDICAL PROVIDERS:    Gastroenterologist: Dr. Lorena Hendricks    Neurologist: neurosurgeon dr montague         PREVENTIVE HEALTH MAINTENANCE             COLORECTAL CANCER SCREENING: Up to date (colonoscopy q10y; sigmoidoscopy q5y; Cologuard q3y) was last done 18         Surgical History:         Arthroscopy: right shoulder; Procedures: colonoscopy 1998/10-,  total of 4 last      Procedures:    EGD ( 18 )     COLONOSCOPY: was last done --3-17-17 & 18 with the following abnormalaties noted-- aries Hendricks         Family History:     Father:  at age 46; Cause of death was Carbon Monoxide poisoning     Mother: Rheumatoid Arthritis     Sister(s): Healthy; 1 sister(s) total     Paternal Grandfather: Coronary Artery Disease         Social History:     Occupation: [employer] [place]     Marital Status:      Children: 3 children         Tobacco/Alcohol/Supplements:     Last Reviewed on 2019 01:13 PM by Floridalma Vela    Tobacco: He has a past history of cigarette smoking; quit date:  .  Non-drinker     Caffeine:  He admits to consuming caffeine via soda ( 3 servings per day ).          Substance Abuse History:     Last Reviewed on 2019 01:06 PM by Floridalma Vela    None         Mental Health History:     Last Reviewed on 2019 01:06 PM by Floridalma Vela        Communicable Diseases (eg STDs):     Last Reviewed on 2019 01:06 PM by Floridalma Vela            Immunizations:     zzFluzone pf-quadrivalent 3 and up 2018     Fluzone Quadrivalent (3+ years) 10/1/2018     Adacel (Tdap) 2013         Allergies:     Last Reviewed on 2019 01:06 PM by Floridalma Vela    Amoxicillin:    Percocet:        Current Medications:     Last Reviewed on 2019 01:06 PM by Floridalma Vela    Entyvio 300mg Powder for Injection Take every two months     Omeprazole 20mg Capsules, Extended Release 1 capsule daily          OBJECTIVE:        Vitals:         Current: 2/25/2019 1:11:56 PM    Ht:  6 ft, 2.5 in;  Wt: 264.4 lbs;  BMI: 33.5    T: 98 F (oral);  BP: 146/87 mm Hg (left arm, sitting);  P: 78 bpm (left arm (BP Cuff), sitting);  sCr: 0.67 mg/dL;  GFR: 180.74        Repeat:     1:43:17 PM     BP:   132/94mm Hg (left arm, sitting, repeat b/p)         Exams:     PHYSICAL EXAM:     GENERAL: Vitals recorded well developed, well nourished;  well groomed;  no apparent distress;     EYES: lids and lacrimal system are normal in appearance; extraocular movements intact; conjunctiva and cornea are normal; PERRLA;     E/N/T:  normal EACs, TMs, nasal/oral mucosa, teeth, gingiva, and oropharynx;     NECK:  supple, full ROM; no thyromegaly; no carotid bruits;     RESPIRATORY: normal respiratory rate and pattern with no distress; normal breath sounds with no rales, rhonchi, wheezes or rubs;     CARDIOVASCULAR: normal rate; rhythm is regular;  no systolic murmur; no edema;     GASTROINTESTINAL: nontender; normal bowel sounds; no organomegaly;     LYMPHATICS:  no adenopathy in cervical, supraclavicular, axillary, or inguinal regions;     SKIN: atypical mole(s) mole is 7-8 mm in size, located on the right side of face adjacent to right ear, and enlarging in size (by patient history); mole #2 is 5-6 mm in size, located on the below left eye, and enlarging in size (by patient history);     MUSCULOSKELETAL:  Normal range of motion, strength and tone;     NEUROLOGIC: GROSSLY INTACT     PSYCHIATRIC:  appropriate affect and demeanor; normal speech pattern; grossly normal memory;         ASSESSMENT           V70.0   Z00.00  Health checkup              DDx:     V79.0   Z13.89  Screening for depression              DDx:     239.2   L98.8  Unspecified skin lesion              DDx:     V25.2   Z30.2  Prophylactic sterilization              DDx:         ORDERS:         Lab Orders:       FUTURE  Future order to be done at patients convenience  (Send-Out)          [id number]  [facility] PHYSICAL: CMP, CBC, TSH, LIPID: [id number], [id number], [id number], [id number]  (Send-Out)           Procedures Ordered:       REFER  Referral to Specialist or Other Facility  (Send-Out)         REFER  Referral to Specialist or Other Facility  (Send-Out)           Other Orders:         Negative EtOH screen  (In-House)           Calculated BMI above the upper parameter and a follow-up plan was documented in the medical record  (In-House)                   PLAN:          Health checkup recheck BP /had hep a vaccines through work         COUNSELING was provided today regarding the following topics: healthy eating habits, low salt diet, regular exercise, and use of seat belts.      FOLLOW-UP: fasting for labs MIPS     BMI Elevated - Follow-Up Plan: He was provided education on weight loss strategies     FOLLOW-UP TESTING #1: FOLLOW-UP LABORATORY:  Labs to be scheduled in the future include PHYSICAL PANEL; CMP, CBC, TSH, LIPID.            Orders:       FUTURE  Future order to be done at patients convenience  (Send-Out)         [id number]  [facility] PHYSICAL: CMP, CBC, TSH, LIPID: [id number], [id number], [id number], [id number]  (Send-Out)           Calculated BMI above the upper parameter and a follow-up plan was documented in the medical record  (In-House)            Screening for depression     MIPS PHQ-9 Depression Screening Completed form scanned and in chart; Total Score 0 Negative alcohol screen           Orders:         Negative EtOH screen  (In-House)            Unspecified skin lesion         REFERRALS:  Referral initiated to a dermatologist ( Dr. Mackenzie Rios; for evaluation of skin lesions on face ).            Orders:       REFER  Referral to Specialist or Other Facility  (Send-Out)            Prophylactic sterilization         REFERRALS:  Referral initiated to a urologist ( Dr. Higinio Luna - [facility]; for evaluation of vasectomy ).            Orders:       REFER  Referral to Specialist or Other  Facility  (Send-Out)               Patient Recommendations:        For  Health checkup:     Limit dietary intake of fat (especially saturated fat) and cholesterol.  Eat a variety of foods, including plenty of fruits, vegetables, and grain containg fiber, limit fat intake to 30% of total calories. Balance caloric intake with energy expended. Maintaining regular physical activity is advised to help prevent heart disease, hypertension, diabetes, and obesity. Always use shoulder/lap restraints when driving or riding in a vehicle, even those equipped with air bags.          The following laboratory testing has been ordered:             CHARGE CAPTURE           **Please note: ICD descriptions below are intended for billing purposes only and may not represent clinical diagnoses**        Primary Diagnosis:         V70.0 Health checkup            Z00.00    Encounter for general adult medical examination without abnormal findings              Orders:          71861   Preventive medicine, established patient, age 18-39 years  (In-House)                Calculated BMI above the upper parameter and a follow-up plan was documented in the medical record  (In-House)           V79.0 Screening for depression            Z13.89    Encounter for screening for other disorder              Orders:             Negative EtOH screen  (In-House)           239.2 Unspecified skin lesion            L98.8    Other specified disorders of the skin and subcutaneous tissue    V25.2 Prophylactic sterilization            Z30.2    Encounter for sterilization

## 2021-06-10 ENCOUNTER — DOCUMENTATION (OUTPATIENT)
Dept: GASTROENTEROLOGY | Facility: CLINIC | Age: 41
End: 2021-06-10

## 2021-06-10 NOTE — PROGRESS NOTES
Received phone call from JCARLOS Beckford with rheumatology regarding patient's ankylosis spondylitis.  She reports that his pain is not under control and would like to change his therapy.  He was prescribed methotrexate and over the past 1 month has completed 2 rounds of steroids without improvement.  She would like to prescribe Cimzia.  Discussed that patient will need to discontinue Entyvio infusions.  She will plan to begin Cimzia following next Entyvio infusion.

## 2021-07-01 VITALS
WEIGHT: 270 LBS | TEMPERATURE: 98.5 F | SYSTOLIC BLOOD PRESSURE: 127 MMHG | HEART RATE: 82 BPM | BODY MASS INDEX: 33.57 KG/M2 | HEIGHT: 75 IN | DIASTOLIC BLOOD PRESSURE: 85 MMHG

## 2021-07-01 VITALS
SYSTOLIC BLOOD PRESSURE: 128 MMHG | DIASTOLIC BLOOD PRESSURE: 80 MMHG | WEIGHT: 268.1 LBS | HEIGHT: 75 IN | TEMPERATURE: 97.1 F | HEART RATE: 89 BPM | BODY MASS INDEX: 33.34 KG/M2

## 2021-07-01 VITALS
HEART RATE: 85 BPM | HEIGHT: 75 IN | WEIGHT: 244.6 LBS | BODY MASS INDEX: 30.41 KG/M2 | TEMPERATURE: 98.9 F | DIASTOLIC BLOOD PRESSURE: 72 MMHG | SYSTOLIC BLOOD PRESSURE: 118 MMHG

## 2021-07-01 VITALS
SYSTOLIC BLOOD PRESSURE: 132 MMHG | HEIGHT: 75 IN | BODY MASS INDEX: 32.87 KG/M2 | DIASTOLIC BLOOD PRESSURE: 94 MMHG | HEART RATE: 78 BPM | WEIGHT: 264.4 LBS | TEMPERATURE: 98 F

## 2021-07-01 VITALS
WEIGHT: 274.2 LBS | HEIGHT: 75 IN | BODY MASS INDEX: 34.09 KG/M2 | TEMPERATURE: 97.8 F | DIASTOLIC BLOOD PRESSURE: 77 MMHG | HEART RATE: 79 BPM | SYSTOLIC BLOOD PRESSURE: 133 MMHG

## 2021-07-02 VITALS
HEART RATE: 72 BPM | TEMPERATURE: 97.4 F | SYSTOLIC BLOOD PRESSURE: 134 MMHG | DIASTOLIC BLOOD PRESSURE: 93 MMHG | WEIGHT: 265.2 LBS | HEIGHT: 75 IN | BODY MASS INDEX: 32.97 KG/M2

## 2021-07-02 VITALS
SYSTOLIC BLOOD PRESSURE: 136 MMHG | BODY MASS INDEX: 30.81 KG/M2 | WEIGHT: 247.8 LBS | HEART RATE: 85 BPM | TEMPERATURE: 98.5 F | HEIGHT: 75 IN | DIASTOLIC BLOOD PRESSURE: 75 MMHG

## 2021-07-02 VITALS
SYSTOLIC BLOOD PRESSURE: 124 MMHG | WEIGHT: 236 LBS | HEIGHT: 75 IN | DIASTOLIC BLOOD PRESSURE: 79 MMHG | BODY MASS INDEX: 29.34 KG/M2 | HEART RATE: 89 BPM | TEMPERATURE: 97.5 F

## 2021-09-02 ENCOUNTER — OFFICE VISIT (OUTPATIENT)
Dept: GASTROENTEROLOGY | Facility: CLINIC | Age: 41
End: 2021-09-02

## 2021-09-02 VITALS
SYSTOLIC BLOOD PRESSURE: 119 MMHG | WEIGHT: 236 LBS | DIASTOLIC BLOOD PRESSURE: 81 MMHG | HEART RATE: 77 BPM | HEIGHT: 76 IN | BODY MASS INDEX: 28.74 KG/M2

## 2021-09-02 DIAGNOSIS — K21.9 GASTROESOPHAGEAL REFLUX DISEASE, UNSPECIFIED WHETHER ESOPHAGITIS PRESENT: ICD-10-CM

## 2021-09-02 DIAGNOSIS — K50.80 CROHN'S DISEASE OF BOTH SMALL AND LARGE INTESTINE WITHOUT COMPLICATION (HCC): Primary | ICD-10-CM

## 2021-09-02 PROBLEM — K50.90 CROHN'S DISEASE: Status: ACTIVE | Noted: 2021-09-02

## 2021-09-02 PROBLEM — M45.0 ANKYLOSING SPONDYLITIS OF MULTIPLE SITES IN SPINE: Status: ACTIVE | Noted: 2021-09-02

## 2021-09-02 PROCEDURE — 99213 OFFICE O/P EST LOW 20 MIN: CPT | Performed by: NURSE PRACTITIONER

## 2021-09-02 RX ORDER — OMEPRAZOLE 20 MG/1
20 CAPSULE, DELAYED RELEASE ORAL DAILY
Qty: 90 CAPSULE | Refills: 1 | Status: SHIPPED | OUTPATIENT
Start: 2021-09-02 | End: 2022-03-02 | Stop reason: SDUPTHER

## 2021-09-02 RX ORDER — OMEPRAZOLE 20 MG/1
20 CAPSULE, DELAYED RELEASE ORAL DAILY
COMMUNITY
End: 2021-09-02 | Stop reason: SDUPTHER

## 2021-09-02 NOTE — PROGRESS NOTES
"Chief Complaint  Crohn's Disease (follow up)    Hi Amaya is a 40 y.o. male who presents to [facility] for follow-up of Crohn's.  History of present Illness  Patient was diagnosed with ankylosing spondylitis. He is followed by [place] [facility] and was recently transitioned to Cimzia for treatment of both AS and Crohn's disease.    He began Cimzia about one month ago.  His back is much improved.      Reports a bowel movement once daily.  Describes stool to be loose.  Denies rectal bleeding and abdominal pain.      Reflux is controlled with omeprazole.      Recent labs per rheumatology.      Past Medical History:   Diagnosis Date   • Crohn's disease (CMS/HCC)    • GERD (gastroesophageal reflux disease)    • Thoracic back pain        Past Surgical History:   Procedure Laterality Date   • COLONOSCOPY  2017, 2018   • ENDOSCOPY  2017, 2018   • SHOULDER SURGERY     • VASECTOMY           Current Outpatient Medications:   •  omeprazole (priLOSEC) 20 MG capsule, Take 1 capsule by mouth Daily., Disp: 90 capsule, Rfl: 1     Allergies   Allergen Reactions   • Amoxicillin Unknown - Low Severity   • Oxycodone-Acetaminophen Unknown - High Severity       Family History   Problem Relation Age of Onset   • Stroke Paternal Grandfather    • Diabetes Paternal Grandfather    • Heart attack Paternal Grandfather    • Coronary artery disease Paternal Grandfather         Social History     Social History Narrative   • Not on file       Objective     Review of Systems   Constitutional: Negative for fever and unexpected weight loss.   Respiratory: Negative for cough and shortness of breath.    Cardiovascular: Negative for chest pain and palpitations.   Gastrointestinal:        See HPI   Neurological: Negative for weakness and confusion.        Vital Signs:   /81 (BP Location: Right arm, Patient Position: Sitting, Cuff Size: Large Adult)   Pulse 77   Ht 193 cm (76\")   Wt 107 kg (236 lb)   " BMI 28.73 kg/m²       Physical Exam  Constitutional:       General: He is not in acute distress.     Appearance: Normal appearance. He is well-developed and normal weight.   HENT:      Head: Normocephalic and atraumatic.   Eyes:      Conjunctiva/sclera: Conjunctivae normal.      Pupils: Pupils are equal, round, and reactive to light.      Visual Fields: Right eye visual fields normal and left eye visual fields normal.   Cardiovascular:      Rate and Rhythm: Normal rate and regular rhythm.      Heart sounds: Normal heart sounds.   Pulmonary:      Effort: Pulmonary effort is normal. No retractions.      Breath sounds: Normal breath sounds and air entry.   Abdominal:      General: Bowel sounds are normal.      Palpations: Abdomen is soft.      Tenderness: There is no abdominal tenderness.      Comments: No appreciable hepatosplenomegaly or ascites   Musculoskeletal:         General: Normal range of motion.      Cervical back: Neck supple.      Right lower leg: No edema.      Left lower leg: No edema.   Lymphadenopathy:      Cervical: No cervical adenopathy.   Skin:     General: Skin is warm and dry.      Findings: No lesion.   Neurological:      General: No focal deficit present.      Mental Status: He is alert and oriented to person, place, and time.   Psychiatric:         Mood and Affect: Mood and affect normal.         Behavior: Behavior normal.         Result Review :   The following data was reviewed by: JCARLOS Pittman on 09/02/2021:  CBC w/diff    CBC w/Diff 1/7/21 1/7/21    0924 0924   WBC 10.28    RBC 4.97 OCC (2-5) (A)   Hemoglobin 14.30    Hematocrit 43.1    MCV 86.7    MCH 28.8    MCHC 33.2    RDW 14.2    Platelets 311.00    (A) Abnormal value            CMP    CMP 1/7/21   Glucose 89   BUN 7   Creatinine 0.68 (A)   Sodium 140   Potassium 4.0   Chloride 106   Calcium 9.1   Albumin 4.0   Total Bilirubin 0.41   Alkaline Phosphatase 137 (A)   AST (SGOT) 14 (A)   ALT (SGPT) 13   (A) Abnormal value                          Assessment and Plan    Diagnoses and all orders for this visit:    1. Crohn's disease of both small and large intestine without complication (CMS/HCC) (Primary)    2. Gastroesophageal reflux disease, unspecified whether esophagitis present  -     omeprazole (priLOSEC) 20 MG capsule; Take 1 capsule by mouth Daily.  Dispense: 90 capsule; Refill: 1      Get copy of most recent labs from rheumatology.  Continue Cimzia per rheumatology.  Continue omeprazole as symptoms are controlled.  Due for colonoscopy next year.        Follow Up   Return in about 6 months (around 3/2/2022) for Crohns.  Patient was given instructions and counseling regarding his condition or for health maintenance advice. Please see specific information pulled into the AVS if appropriate.

## 2022-03-02 ENCOUNTER — OFFICE VISIT (OUTPATIENT)
Dept: GASTROENTEROLOGY | Facility: CLINIC | Age: 42
End: 2022-03-02

## 2022-03-02 VITALS
DIASTOLIC BLOOD PRESSURE: 98 MMHG | SYSTOLIC BLOOD PRESSURE: 144 MMHG | BODY MASS INDEX: 30.08 KG/M2 | HEIGHT: 76 IN | WEIGHT: 247 LBS | HEART RATE: 79 BPM

## 2022-03-02 DIAGNOSIS — K21.9 GASTROESOPHAGEAL REFLUX DISEASE, UNSPECIFIED WHETHER ESOPHAGITIS PRESENT: ICD-10-CM

## 2022-03-02 DIAGNOSIS — K50.80 CROHN'S DISEASE OF BOTH SMALL AND LARGE INTESTINE WITHOUT COMPLICATION: Primary | ICD-10-CM

## 2022-03-02 PROCEDURE — 99214 OFFICE O/P EST MOD 30 MIN: CPT | Performed by: NURSE PRACTITIONER

## 2022-03-02 RX ORDER — OMEPRAZOLE 20 MG/1
20 CAPSULE, DELAYED RELEASE ORAL DAILY
Qty: 90 CAPSULE | Refills: 1 | Status: SHIPPED | OUTPATIENT
Start: 2022-03-02 | End: 2022-09-07 | Stop reason: SDUPTHER

## 2022-03-02 RX ORDER — METHOTREXATE SODIUM 25 MG/ML
INJECTION, SOLUTION INTRA-ARTERIAL; INTRAMUSCULAR; INTRAVENOUS
COMMUNITY
Start: 2021-12-20 | End: 2022-10-24

## 2022-03-02 RX ORDER — FOLIC ACID 1 MG/1
1 TABLET ORAL DAILY
COMMUNITY
Start: 2021-05-01 | End: 2022-10-24

## 2022-03-02 NOTE — PROGRESS NOTES
Chief Complaint  Crohn's Disease    Hi Amaya is a 41 y.o. male who presents to [facility] for follow-up of crohn's.      History of present Illness    PMH significant for ankylosing spondylitis.  Followed by [facility] rheumatology and prescribed cimzia.      He reports that he's doing okay.  Still on cimzia and feels like it's helped his back.  He felt that entyvio was better for crohns.      Has about 2 bowel movements per day.  Has diarrhea about 1/2 the time.  Denies rectal bleeding.      Will have some occasional lower abdominal pressure.  This is sporadic.      Heartburn is managed with omeprazole.      Past Medical History:   Diagnosis Date   • Ankylosing spondylitis (HCC)    • Crohn's disease (HCC)    • GERD (gastroesophageal reflux disease)    • Psoriasis    • Thoracic back pain        Past Surgical History:   Procedure Laterality Date   • COLONOSCOPY  2017, 2018   • ENDOSCOPY  2017, 2018   • SHOULDER SURGERY     • VASECTOMY           Current Outpatient Medications:   •  Certolizumab Pegol (Cimzia Starter Kit) 6 X 200 MG/ML kit, inject 2 ml SQ  every 4 weeks as 2 equally divided injections given at 2 different injection sites in the abdomen or thigh, Disp: , Rfl:   •  folic acid (FOLVITE) 1 MG tablet, Take 1 tablet by mouth Daily., Disp: , Rfl:   •  Methotrexate Sodium syringe, inject 0.8 milliliter by intramuscular route  every week, Disp: , Rfl:   •  omeprazole (priLOSEC) 20 MG capsule, Take 1 capsule by mouth Daily., Disp: 90 capsule, Rfl: 1     Allergies   Allergen Reactions   • Amoxicillin Unknown - Low Severity   • Oxycodone-Acetaminophen Unknown - High Severity       Family History   Problem Relation Age of Onset   • Stroke Paternal Grandfather    • Diabetes Paternal Grandfather    • Heart attack Paternal Grandfather    • Coronary artery disease Paternal Grandfather         Social History     Social History Narrative   • Not on file       Objective  "      Vital Signs:   /98 (BP Location: Left arm, Patient Position: Sitting, Cuff Size: Adult)   Pulse 79   Ht 193 cm (76\")   Wt 112 kg (247 lb)   BMI 30.07 kg/m²       Physical Exam  Constitutional:       General: He is not in acute distress.     Appearance: Normal appearance. He is well-developed and normal weight.   HENT:      Head: Normocephalic and atraumatic.   Eyes:      Conjunctiva/sclera: Conjunctivae normal.      Pupils: Pupils are equal, round, and reactive to light.      Visual Fields: Right eye visual fields normal and left eye visual fields normal.   Cardiovascular:      Rate and Rhythm: Normal rate and regular rhythm.      Heart sounds: Normal heart sounds.   Pulmonary:      Effort: Pulmonary effort is normal. No retractions.      Breath sounds: Normal breath sounds and air entry.   Abdominal:      General: Bowel sounds are normal.      Palpations: Abdomen is soft.      Tenderness: There is no abdominal tenderness.      Comments: No appreciable hepatosplenomegaly or ascites   Musculoskeletal:         General: Normal range of motion.      Cervical back: Neck supple.      Right lower leg: No edema.      Left lower leg: No edema.   Lymphadenopathy:      Cervical: No cervical adenopathy.   Skin:     General: Skin is warm and dry.      Findings: No lesion.   Neurological:      General: No focal deficit present.      Mental Status: He is alert and oriented to person, place, and time.   Psychiatric:         Mood and Affect: Mood and affect normal.         Behavior: Behavior normal.         Result Review :     The following data was reviewed by: JCARLOS Pittman on 03/02/2022:    Labs reviewed from [place] rheumatology.     12/21/2021 CMP: Creatinine 0.64, alk phos 100, AST 16, ALT 20, total bilirubin 0.6.  CBC: Hemoglobin 15.2, hematocrit 44.9, platelets 282.    11/8/2021 sed rate-6                      Assessment and Plan    Diagnoses and all orders for this visit:    1. Crohn's disease of both " small and large intestine without complication (HCC) (Primary)    2. Gastroesophageal reflux disease, unspecified whether esophagitis present  -     omeprazole (priLOSEC) 20 MG capsule; Take 1 capsule by mouth Daily.  Dispense: 90 capsule; Refill: 1      Continue Cimzia per rheumatology for control of ankylosing spondylitis and Crohn's disease.  Continue daily omeprazole as this is effective for control of reflux.  Due for surveillance colonoscopy in November.  F/u with PCP regarding knots noted on back and abdomen.            Follow Up   Return in about 6 months (around 9/2/2022) for Crohns.  Patient was given instructions and counseling regarding his condition or for health maintenance advice. Please see specific information pulled into the AVS if appropriate.

## 2022-03-11 ENCOUNTER — OFFICE VISIT (OUTPATIENT)
Dept: FAMILY MEDICINE CLINIC | Age: 42
End: 2022-03-11

## 2022-03-11 ENCOUNTER — LAB (OUTPATIENT)
Dept: LAB | Facility: HOSPITAL | Age: 42
End: 2022-03-11

## 2022-03-11 ENCOUNTER — HOSPITAL ENCOUNTER (OUTPATIENT)
Dept: GENERAL RADIOLOGY | Facility: HOSPITAL | Age: 42
Discharge: HOME | End: 2022-03-11

## 2022-03-11 VITALS
BODY MASS INDEX: 29.96 KG/M2 | OXYGEN SATURATION: 95 % | DIASTOLIC BLOOD PRESSURE: 88 MMHG | SYSTOLIC BLOOD PRESSURE: 143 MMHG | HEIGHT: 76 IN | HEART RATE: 89 BPM | WEIGHT: 246 LBS

## 2022-03-11 DIAGNOSIS — Z00.00 PHYSICAL EXAM: ICD-10-CM

## 2022-03-11 DIAGNOSIS — R05.9 COUGH: ICD-10-CM

## 2022-03-11 DIAGNOSIS — Z00.00 PHYSICAL EXAM: Primary | ICD-10-CM

## 2022-03-11 PROBLEM — M54.6 THORACIC BACK PAIN: Status: ACTIVE | Noted: 2022-03-11

## 2022-03-11 LAB
CHOLEST SERPL-MCNC: 151 MG/DL (ref 0–200)
HBA1C MFR BLD: 5.3 % (ref 4.8–5.6)
HDLC SERPL-MCNC: 25 MG/DL (ref 40–60)
LDLC SERPL CALC-MCNC: 94 MG/DL (ref 0–100)
LDLC/HDLC SERPL: 3.57 {RATIO}
TRIGL SERPL-MCNC: 184 MG/DL (ref 0–150)
TSH SERPL DL<=0.05 MIU/L-ACNC: 1.73 UIU/ML (ref 0.27–4.2)
VLDLC SERPL-MCNC: 32 MG/DL (ref 5–40)

## 2022-03-11 PROCEDURE — 80061 LIPID PANEL: CPT

## 2022-03-11 PROCEDURE — 99395 PREV VISIT EST AGE 18-39: CPT | Performed by: FAMILY MEDICINE

## 2022-03-11 PROCEDURE — 71046 X-RAY EXAM CHEST 2 VIEWS: CPT

## 2022-03-11 PROCEDURE — 83036 HEMOGLOBIN GLYCOSYLATED A1C: CPT

## 2022-03-11 PROCEDURE — 84443 ASSAY THYROID STIM HORMONE: CPT

## 2022-03-11 PROCEDURE — 36415 COLL VENOUS BLD VENIPUNCTURE: CPT

## 2022-03-11 RX ORDER — MULTIPLE VITAMINS W/ MINERALS TAB 9MG-400MCG
1 TAB ORAL DAILY
COMMUNITY

## 2022-03-11 RX ORDER — MONTELUKAST SODIUM 10 MG/1
10 TABLET ORAL NIGHTLY
Qty: 30 TABLET | Refills: 1 | Status: SHIPPED | OUTPATIENT
Start: 2022-03-11 | End: 2022-10-24

## 2022-03-11 NOTE — PROGRESS NOTES
"Hi SEVILLA Workcarlos a presents to [facility].    Chief Complaint:  Wellness exam    Subjective       History of Present Illness:  Alex is in today for wellness exam.  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  He does smoke \"off-and-on\", but he states he has not had any cigarettes for the last 2 months.  He denies alcohol use.  He says that he takes methotrexate.  He gets colonoscopies regularly through his gastroenterologist.  He is unaware of prostate cancer being an issue within his family.  His grandfather did have heart disease and diabetes.      Review of Systems:  Review of Systems   Constitutional: Negative for chills and fever.   HENT: Positive for congestion.    Respiratory: Positive for cough (the last 2 months). Negative for shortness of breath.    Cardiovascular: Negative for chest pain and palpitations.   Gastrointestinal: Negative for abdominal pain, nausea and vomiting.        Objective   Medical History:  Past Medical History:   • Ankylosing spondylitis (HCC)   • Crohn's disease (HCC)   • GERD (gastroesophageal reflux disease)   • Psoriasis   • Thoracic back pain     Past Surgical History:   • COLONOSCOPY   • ENDOSCOPY   • SHOULDER SURGERY   • VASECTOMY      Family History   Problem Relation Age of Onset   • Stroke Paternal Grandfather    • Diabetes Paternal Grandfather    • Heart attack Paternal Grandfather    • Coronary artery disease Paternal Grandfather      Social History     Tobacco Use   • Smoking status: Former Smoker   • Smokeless tobacco: Never Used   Substance Use Topics   • Alcohol use: Never       There are no preventive care reminders to display for this patient.     Immunization History   Administered Date(s) Administered   • COVID-19 (CAROLYNN) 05/03/2021, 05/19/2021, 01/20/2022   • Flu Vaccine Intradermal Quad 18-64YR 11/01/2021   • Hepatitis B 01/07/2003, 02/25/2003, 10/07/2003   • Tdap 07/23/2013 " "      Allergies   Allergen Reactions   • Amoxicillin Unknown - Low Severity   • Oxycodone-Acetaminophen Unknown - High Severity        Medications:  Current Outpatient Medications on File Prior to Visit   Medication Sig   • Certolizumab Pegol (Cimzia Starter Kit) 6 X 200 MG/ML kit inject 2 ml SQ  every 4 weeks as 2 equally divided injections given at 2 different injection sites in the abdomen or thigh   • folic acid (FOLVITE) 1 MG tablet Take 1 tablet by mouth Daily.   • Methotrexate Sodium syringe inject 0.8 milliliter by intramuscular route  every week   • Misc Natural Products (SARSAPARILLA ROOT PO) Take 1,000 mg by mouth.   • multivitamin with minerals tablet tablet Take 1 tablet by mouth Daily.   • omeprazole (priLOSEC) 20 MG capsule Take 1 capsule by mouth Daily.     No current facility-administered medications on file prior to visit.       Vital Signs:   /88 (BP Location: Left arm, Patient Position: Sitting, Cuff Size: Adult)   Pulse 89   Ht 193 cm (75.98\")   Wt 112 kg (246 lb)   SpO2 95%   BMI 29.96 kg/m²       Physical Exam:  Physical Exam  Vitals and nursing note reviewed.   Constitutional:       General: He is not in acute distress.     Appearance: He is not ill-appearing.   HENT:      Right Ear: Tympanic membrane and ear canal normal.      Left Ear: Tympanic membrane and ear canal normal.      Mouth/Throat:      Mouth: Mucous membranes are moist.      Comments: Pharynx appears normal  Eyes:      Extraocular Movements: Extraocular movements intact.      Pupils: Pupils are equal, round, and reactive to light.   Neck:      Thyroid: No thyromegaly.   Cardiovascular:      Rate and Rhythm: Normal rate and regular rhythm.      Heart sounds: No murmur heard.  Pulmonary:      Effort: Pulmonary effort is normal.      Breath sounds: Normal breath sounds.   Abdominal:      General: There is no distension.      Palpations: Abdomen is soft. There is no mass.      Tenderness: There is no abdominal " tenderness.   Musculoskeletal:      Cervical back: Normal range of motion.   Skin:     Findings: No lesion or rash.   Neurological:      General: No focal deficit present.      Mental Status: He is oriented to person, place, and time.      Cranial Nerves: No cranial nerve deficit.   Psychiatric:         Mood and Affect: Mood normal.         Result Review      The following data was reviewed by Filiberto Saucedo MD on 03/11/2022.  Lab Results   Component Value Date    WBC 10.28 01/07/2021    HGB 14.30 01/07/2021    HCT 43.1 01/07/2021    MCV 86.7 01/07/2021    .00 01/07/2021     Lab Results   Component Value Date    GLUCOSE 89 01/07/2021    BUN 7 01/07/2021    CREATININE 0.68 (L) 01/07/2021     01/07/2021    K 4.0 01/07/2021     01/07/2021    CO2 24 01/07/2021    CALCIUM 9.1 01/07/2021    PROTEINTOT 6.9 01/07/2021    ALBUMIN 4.0 01/07/2021    ALT 13 01/07/2021    AST 14 (L) 01/07/2021    ALKPHOS 137 (H) 01/07/2021    BILITOT 0.41 01/07/2021    GLOB 2.9 01/07/2021    BCR 10 01/07/2021    ANIONGAP 14 01/07/2021      Lab Results   Component Value Date    CHLPL 144 09/03/2020    TRIG 150 09/03/2020    HDL 22 (L) 09/03/2020    LDL 92 09/03/2020     Lab Results   Component Value Date    TSH 2.310 01/07/2021     No results found for: HGBA1C         Assessment and Plan:   Today, we have reviewed his care.  Alex is in fairly good health overall.  His blood pressure is mildly elevated today, and it will be repeated.  I have encouraged him to continue to work on healthy lifestyle choices with regard to diet and regular exercise.  He had blood work done today in [place].  I am not going to redraw labs.  We will contact him in about 90 days for testing as noted below.  With regard to cough, his lungs are clear.  We will prescribe Singulair for a month given some history of allergy issues and see if it is of benefit.  Chest x-ray will be done as a precaution.     Diagnoses and all orders for this  visit:    1. Physical exam (Primary)  -     Lipid Panel; Future  -     Hemoglobin A1c; Future  -     TSH; Future    2. Cough  -     montelukast (Singulair) 10 MG tablet; Take 1 tablet by mouth Every Night.  Dispense: 30 tablet; Refill: 1  -     XR Chest 2 View; Future          Follow Up   Return in about 1 year (around 3/13/2023) for Annual physical.  Patient was given instructions and counseling regarding his condition or for health maintenance advice. Please see specific information pulled into the AVS if appropriate.

## 2022-09-07 ENCOUNTER — OFFICE VISIT (OUTPATIENT)
Dept: GASTROENTEROLOGY | Facility: CLINIC | Age: 42
End: 2022-09-07

## 2022-09-07 VITALS
HEIGHT: 76 IN | SYSTOLIC BLOOD PRESSURE: 144 MMHG | HEART RATE: 81 BPM | BODY MASS INDEX: 27.16 KG/M2 | WEIGHT: 223 LBS | DIASTOLIC BLOOD PRESSURE: 104 MMHG

## 2022-09-07 DIAGNOSIS — K21.9 GASTROESOPHAGEAL REFLUX DISEASE, UNSPECIFIED WHETHER ESOPHAGITIS PRESENT: ICD-10-CM

## 2022-09-07 DIAGNOSIS — K50.80 CROHN'S DISEASE OF BOTH SMALL AND LARGE INTESTINE WITHOUT COMPLICATION: Primary | ICD-10-CM

## 2022-09-07 PROCEDURE — 99214 OFFICE O/P EST MOD 30 MIN: CPT | Performed by: NURSE PRACTITIONER

## 2022-09-07 RX ORDER — OMEPRAZOLE 20 MG/1
20 CAPSULE, DELAYED RELEASE ORAL DAILY
Qty: 90 CAPSULE | Refills: 1 | Status: SHIPPED | OUTPATIENT
Start: 2022-09-07 | End: 2023-03-16

## 2022-09-07 RX ORDER — SOD SULF/POT CHLORIDE/MAG SULF 1.479 G
12 TABLET ORAL TAKE AS DIRECTED
Qty: 24 TABLET | Refills: 0 | Status: SHIPPED | OUTPATIENT
Start: 2022-09-07 | End: 2022-10-24

## 2022-09-07 NOTE — PROGRESS NOTES
Chief Complaint     Crohn's Disease    History of Present Illness     Hi Amaya is a 41 y.o. male who presents to [facility] for follow-up of crohns.    PMH significant for ankylosing spondylitis.  Followed by [facility] rheumatology and prescribed cimzia. He reports that he's doing well.      He's having a bowel movement 1-2 times per day.  Will occasionally have diarrhea depending on what he eats.  Notices this more with seeds, spicy foods and dairy.  Denies rectal bleeding and abdominal pain.  Admits some bothersome gas that seems to be related to foods.  Taking simethicone as needed.    Reflux is controlled with omeprazole daily.       History      Past Medical History:   Diagnosis Date   • Ankylosing spondylitis (HCC)    • Crohn's disease (HCC)    • GERD (gastroesophageal reflux disease)    • Psoriasis    • Thoracic back pain      Past Surgical History:   Procedure Laterality Date   • COLONOSCOPY  2017, 2018   • ENDOSCOPY  2017, 2018   • SHOULDER SURGERY     • VASECTOMY       Family History   Problem Relation Age of Onset   • Stroke Paternal Grandfather    • Diabetes Paternal Grandfather    • Heart attack Paternal Grandfather    • Coronary artery disease Paternal Grandfather         Current Medications       Current Outpatient Medications:   •  Certolizumab Pegol (Cimzia Starter Kit) 6 X 200 MG/ML kit, inject 2 ml SQ  every 4 weeks as 2 equally divided injections given at 2 different injection sites in the abdomen or thigh, Disp: , Rfl:   •  folic acid (FOLVITE) 1 MG tablet, Take 1 tablet by mouth Daily., Disp: , Rfl:   •  Methotrexate Sodium syringe, inject 0.8 milliliter by intramuscular route  every week, Disp: , Rfl:   •  Misc Natural Products (SARSAPARILLA ROOT PO), Take 1,000 mg by mouth., Disp: , Rfl:   •  montelukast (Singulair) 10 MG tablet, Take 1 tablet by mouth Every Night., Disp: 30 tablet, Rfl: 1  •  multivitamin with minerals tablet tablet, Take 1  "tablet by mouth Daily., Disp: , Rfl:   •  omeprazole (priLOSEC) 20 MG capsule, Take 1 capsule by mouth Daily., Disp: 90 capsule, Rfl: 1  •  Sodium Sulfate-Mag Sulfate-KCl (Sutab) 3525-428-537 MG tablet, Take 12 tablets by mouth Take As Directed. Take 12 tablets at 6 pm and 12 tablets 4 hours prior to procedure., Disp: 24 tablet, Rfl: 0     Allergies     Allergies   Allergen Reactions   • Amoxicillin Unknown - Low Severity   • Oxycodone-Acetaminophen Unknown - High Severity       Social History       Social History     Social History Narrative   • Not on file         Objective       BP (!) 144/104 (BP Location: Left arm, Patient Position: Sitting, Cuff Size: Adult)   Pulse 81   Ht 193 cm (76\")   Wt 101 kg (223 lb)   BMI 27.14 kg/m²       Physical Exam  Constitutional:       General: He is not in acute distress.     Appearance: Normal appearance. He is well-developed and normal weight.   HENT:      Head: Normocephalic and atraumatic.   Eyes:      Conjunctiva/sclera: Conjunctivae normal.      Pupils: Pupils are equal, round, and reactive to light.      Visual Fields: Right eye visual fields normal and left eye visual fields normal.   Cardiovascular:      Rate and Rhythm: Normal rate and regular rhythm.      Heart sounds: Normal heart sounds.   Pulmonary:      Effort: Pulmonary effort is normal. No retractions.      Breath sounds: Normal breath sounds and air entry.   Abdominal:      General: Bowel sounds are normal.      Palpations: Abdomen is soft.      Tenderness: There is no abdominal tenderness.      Comments: No appreciable hepatosplenomegaly or ascites   Musculoskeletal:         General: Normal range of motion.      Cervical back: Neck supple.      Right lower leg: No edema.      Left lower leg: No edema.   Lymphadenopathy:      Cervical: No cervical adenopathy.   Skin:     General: Skin is warm and dry.      Findings: No lesion.   Neurological:      General: No focal deficit present.      Mental Status: He " is alert and oriented to person, place, and time.   Psychiatric:         Mood and Affect: Mood and affect normal.         Behavior: Behavior normal.         Results       Result Review :                     Assessment and Plan              Diagnoses and all orders for this visit:    1. Crohn's disease of both small and large intestine without complication (HCC) (Primary)  -     Case Request; Standing  -     Case Request  -     CBC Auto Differential; Future  -     Comprehensive Metabolic Panel; Future  -     C-reactive Protein; Future  -     Iron Profile; Future  -     Sedimentation Rate; Future  -     Vitamin D 25 Hydroxy; Future    2. Gastroesophageal reflux disease, unspecified whether esophagitis present  -     omeprazole (priLOSEC) 20 MG capsule; Take 1 capsule by mouth Daily.  Dispense: 90 capsule; Refill: 1    Other orders  -     Follow Anesthesia Guidelines / Protocol; Future  -     Obtain Informed Consent; Future  -     Sodium Sulfate-Mag Sulfate-KCl (Sutab) 2155-044-999 MG tablet; Take 12 tablets by mouth Take As Directed. Take 12 tablets at 6 pm and 12 tablets 4 hours prior to procedure.  Dispense: 24 tablet; Refill: 0          COLONOSCOPY (N/A) Surgical Risk and Benefits discussed: Possible risks/complications, benefits, and alternatives to surgical or invasive procedure have been explained to patient and/or legal guardian; risks include bleeding, infection, and perforation. Patient has been evaluated and can tolerate anesthesia and/or sedation. Risks, benefits, and alternatives to anesthesia and sedation have been explained to patient and/or legal guardian.        Follow Up     Follow Up   Return for F/U after procedure.  Patient was given instructions and counseling regarding his condition or for health maintenance advice. Please see specific information pulled into the AVS if appropriate.

## 2022-10-06 ENCOUNTER — TELEPHONE (OUTPATIENT)
Dept: GASTROENTEROLOGY | Facility: CLINIC | Age: 42
End: 2022-10-06

## 2022-10-06 NOTE — TELEPHONE ENCOUNTER
Left message with patient asking for a returned call to follow up on overdue results for laboratory tests.

## 2022-10-24 ENCOUNTER — OFFICE VISIT (OUTPATIENT)
Dept: FAMILY MEDICINE CLINIC | Age: 42
End: 2022-10-24

## 2022-10-24 VITALS
TEMPERATURE: 98.2 F | HEIGHT: 76 IN | BODY MASS INDEX: 28.06 KG/M2 | WEIGHT: 230.4 LBS | DIASTOLIC BLOOD PRESSURE: 92 MMHG | OXYGEN SATURATION: 97 % | SYSTOLIC BLOOD PRESSURE: 151 MMHG | HEART RATE: 70 BPM

## 2022-10-24 DIAGNOSIS — F43.9 STRESS: Primary | ICD-10-CM

## 2022-10-24 DIAGNOSIS — R09.81 SINUS CONGESTION: ICD-10-CM

## 2022-10-24 DIAGNOSIS — I10 PRIMARY HYPERTENSION: ICD-10-CM

## 2022-10-24 PROCEDURE — 99214 OFFICE O/P EST MOD 30 MIN: CPT | Performed by: NURSE PRACTITIONER

## 2022-10-24 RX ORDER — CHLORCYCLIZINE HYDROCHLORIDE AND PSEUDOEPHEDRINE HYDROCHLORIDE 25; 60 MG/1; MG/1
1 TABLET ORAL DAILY
Qty: 42 TABLET | Refills: 0 | Status: SHIPPED | OUTPATIENT
Start: 2022-10-24 | End: 2022-12-22

## 2022-10-24 RX ORDER — BUSPIRONE HYDROCHLORIDE 5 MG/1
5 TABLET ORAL 2 TIMES DAILY
Qty: 60 TABLET | Refills: 1 | Status: SHIPPED | OUTPATIENT
Start: 2022-10-24 | End: 2022-11-28 | Stop reason: SDUPTHER

## 2022-10-24 RX ORDER — LISINOPRIL 10 MG/1
10 TABLET ORAL DAILY
Qty: 30 TABLET | Refills: 0 | Status: SHIPPED | OUTPATIENT
Start: 2022-10-24 | End: 2022-11-22

## 2022-10-24 NOTE — PROGRESS NOTES
"Chief Complaint  Hypertension, Dizziness (Sx started 4 weeks ago ), Headache, and Stress    Subjective    {Problem List  Visit Diagnosis   Encounters  Notes  Medications  Labs  Result Review Imaging  Media :23}    Hi Amaya presents to [facility]  Hypertension  This is a recurrent problem. The current episode started more than 1 month ago. The problem has been gradually worsening since onset. Associated symptoms include anxiety and headaches. Pertinent negatives include no blurred vision, chest pain, malaise/fatigue or peripheral edema. Risk factors for coronary artery disease include male gender and family history. Past treatments include nothing. Current antihypertension treatment includes nothing. The current treatment provides no improvement. Compliance problems include psychosocial issues.    Patient recently started a new job and since his stress level and blood pressure has been elevated. Having frequent headaches. Has seasonal allergies, has tried claratin, zyrtec, allegra, muciex and stahist has worked the best for him along with sinex nasal spray but he does not use that frequently due to being habit forming.     Objective   Vital Signs:  /92 (BP Location: Left arm, Patient Position: Sitting, Cuff Size: Adult)   Pulse 70   Temp 98.2 °F (36.8 °C) (Oral)   Ht 193 cm (76\")   Wt 105 kg (230 lb 6.4 oz)   SpO2 97% Comment: room air  BMI 28.05 kg/m²   Estimated body mass index is 28.05 kg/m² as calculated from the following:    Height as of this encounter: 193 cm (76\").    Weight as of this encounter: 105 kg (230 lb 6.4 oz).          Physical Exam  HENT:      Head: Normocephalic.   Cardiovascular:      Rate and Rhythm: Normal rate and regular rhythm.   Pulmonary:      Effort: Pulmonary effort is normal. No respiratory distress.      Breath sounds: Normal breath sounds. No stridor. No wheezing, rhonchi or rales.   Musculoskeletal:      Right lower leg: " No edema.      Left lower leg: No edema.   Skin:     General: Skin is warm and dry.   Neurological:      Mental Status: He is alert and oriented to person, place, and time.   Psychiatric:         Mood and Affect: Mood normal.        Result Review :                Assessment and Plan   Diagnoses and all orders for this visit:    1. Stress (Primary)  -     busPIRone (BUSPAR) 5 MG tablet; Take 1 tablet by mouth 2 (Two) Times a Day.  Dispense: 60 tablet; Refill: 1    2. Primary hypertension  Assessment & Plan:  Hypertension is worsening.  Dietary sodium restriction.  Weight loss.  Regular aerobic exercise.  Medication changes per orders.  Ambulatory blood pressure monitoring.  Blood pressure will be reassessed in 4 weeks.    Orders:  -     lisinopril (PRINIVIL,ZESTRIL) 10 MG tablet; Take 1 tablet by mouth Daily.  Dispense: 30 tablet; Refill: 0    3. Sinus congestion  -     Chlorcyclizine-Pseudoephed (Stahist AD) 25-60 MG tablet; Take 1 tablet by mouth Daily.  Dispense: 42 tablet; Refill: 0           Follow Up   Return in 1 month (on 11/24/2022), or if symptoms worsen or fail to improve, for Recheck BP with PCP.  Patient was given instructions and counseling regarding his condition or for health maintenance advice. Please see specific information pulled into the AVS if appropriate.

## 2022-10-24 NOTE — ASSESSMENT & PLAN NOTE
Hypertension is worsening.  Dietary sodium restriction.  Weight loss.  Regular aerobic exercise.  Medication changes per orders.  Ambulatory blood pressure monitoring.  Blood pressure will be reassessed in 4 weeks.

## 2022-11-22 DIAGNOSIS — I10 PRIMARY HYPERTENSION: ICD-10-CM

## 2022-11-22 RX ORDER — LISINOPRIL 20 MG/1
20 TABLET ORAL DAILY
Qty: 90 TABLET | Refills: 0 | Status: SHIPPED | OUTPATIENT
Start: 2022-11-22 | End: 2022-11-28

## 2022-11-28 ENCOUNTER — OFFICE VISIT (OUTPATIENT)
Dept: FAMILY MEDICINE CLINIC | Age: 42
End: 2022-11-28

## 2022-11-28 VITALS
HEIGHT: 76 IN | HEART RATE: 78 BPM | DIASTOLIC BLOOD PRESSURE: 83 MMHG | SYSTOLIC BLOOD PRESSURE: 120 MMHG | BODY MASS INDEX: 28.15 KG/M2 | WEIGHT: 231.2 LBS | TEMPERATURE: 97.6 F

## 2022-11-28 DIAGNOSIS — F43.9 STRESS: ICD-10-CM

## 2022-11-28 DIAGNOSIS — I10 PRIMARY HYPERTENSION: ICD-10-CM

## 2022-11-28 PROCEDURE — 99213 OFFICE O/P EST LOW 20 MIN: CPT | Performed by: FAMILY MEDICINE

## 2022-11-28 RX ORDER — BUSPIRONE HYDROCHLORIDE 5 MG/1
5 TABLET ORAL 2 TIMES DAILY
Qty: 180 TABLET | Refills: 1 | Status: SHIPPED | OUTPATIENT
Start: 2022-11-28 | End: 2023-03-13

## 2022-11-28 RX ORDER — LISINOPRIL 20 MG/1
10 TABLET ORAL DAILY
Qty: 90 TABLET | Refills: 0
Start: 2022-11-28 | End: 2023-03-06

## 2022-11-28 NOTE — PROGRESS NOTES
Hi Amaya presents to [facility].    Chief Complaint:  Blood pressure follow up    Subjective   {Problem List  Visit Diagnosis   Encounters  Notes  Medications  Labs  Result Review Imaging  Media :23}     History of Present Illness:  Alex is in today for follow-up on hypertension.  He has fairly recently been started on antihypertensive therapy in the form of lisinopril.  He is currently taking 10 mg daily of this.  He was prescribed the 20 mg dose, but he is cutting that in half.  He denies any associated cough or other concerns with the medication.    In addition to the above, Alex has had some ongoing issues with anxiety.  He is taking a low-dose of buspirone as needed for this, and he states it does seem to be of benefit to him.    Review of Systems:  Review of Systems   Constitutional: Negative for chills and fever.   Respiratory: Negative for cough and shortness of breath.    Cardiovascular: Negative for chest pain and palpitations.   Gastrointestinal: Negative for abdominal pain, nausea and vomiting.        Objective   Medical History:  Past Medical History:   • Ankylosing spondylitis (HCC)   • Crohn's disease (HCC)   • GERD (gastroesophageal reflux disease)   • Psoriasis   • Thoracic back pain     Past Surgical History:   • COLONOSCOPY   • ENDOSCOPY   • SHOULDER SURGERY   • VASECTOMY      Family History   Problem Relation Age of Onset   • Stroke Paternal Grandfather    • Diabetes Paternal Grandfather    • Heart attack Paternal Grandfather    • Coronary artery disease Paternal Grandfather      Social History     Tobacco Use   • Smoking status: Some Days     Packs/day: 0.25     Years: 20.00     Pack years: 5.00     Types: Cigarettes   • Smokeless tobacco: Never   Substance Use Topics   • Alcohol use: Never       Health Maintenance Due   Topic Date Due   • Pneumococcal Vaccine 0-64 (1 - PCV) Never done        Immunization History   Administered Date(s) Administered  "  • COVID-19 (CAROLYNN) 05/03/2021, 05/19/2021, 01/20/2022   • Flu Vaccine Intradermal Quad 18-64YR 11/01/2021   • Flu Vaccine Quad PF 6-35MO 10/01/2022   • Hepatitis B 01/07/2003, 02/25/2003, 10/07/2003   • Tdap 07/23/2013       Allergies   Allergen Reactions   • Amoxicillin Unknown - Low Severity   • Oxycodone-Acetaminophen Unknown - High Severity        Medications:  Current Outpatient Medications on File Prior to Visit   Medication Sig   • Certolizumab Pegol (Cimzia Starter Kit) 6 X 200 MG/ML kit inject 2 ml SQ  every 4 weeks as 2 equally divided injections given at 2 different injection sites in the abdomen or thigh   • Chlorcyclizine-Pseudoephed (Stahist AD) 25-60 MG tablet Take 1 tablet by mouth Daily.   • Misc Natural Products (SARSAPARILLA ROOT PO) Take 1,000 mg by mouth.   • multivitamin with minerals tablet tablet Take 1 tablet by mouth Daily.   • omeprazole (priLOSEC) 20 MG capsule Take 1 capsule by mouth Daily.   • [DISCONTINUED] busPIRone (BUSPAR) 5 MG tablet Take 1 tablet by mouth 2 (Two) Times a Day.   • [DISCONTINUED] lisinopril (PRINIVIL,ZESTRIL) 20 MG tablet Take 1 tablet by mouth Daily.     No current facility-administered medications on file prior to visit.       Vital Signs:   /83 (BP Location: Left arm, Patient Position: Sitting)   Pulse 78   Temp 97.6 °F (36.4 °C) (Oral)   Ht 193 cm (75.98\")   Wt 105 kg (231 lb 3.2 oz)   BMI 28.15 kg/m²       Physical Exam:  Physical Exam  Vitals reviewed.   Constitutional:       General: He is not in acute distress.     Appearance: He is not ill-appearing.   Eyes:      Pupils: Pupils are equal, round, and reactive to light.   Neck:      Comments: No thyromegaly  Cardiovascular:      Rate and Rhythm: Normal rate and regular rhythm.   Pulmonary:      Effort: Pulmonary effort is normal.      Breath sounds: Normal breath sounds.   Abdominal:      General: There is no distension.      Palpations: Abdomen is soft.      Tenderness: There is no abdominal " tenderness.   Musculoskeletal:      Cervical back: Neck supple.   Lymphadenopathy:      Cervical: No cervical adenopathy.   Skin:     Findings: No lesion or rash.   Neurological:      Mental Status: He is alert.         Result Review      The following data was reviewed by Filiberto Saucedo MD on 11/28/2022.  Lab Results   Component Value Date    WBC 10.28 01/07/2021    HGB 14.30 01/07/2021    HCT 43.1 01/07/2021    MCV 86.7 01/07/2021    .00 01/07/2021     Lab Results   Component Value Date    GLUCOSE 89 01/07/2021    BUN 7 01/07/2021    CREATININE 0.68 (L) 01/07/2021     01/07/2021    K 4.0 01/07/2021     01/07/2021    CO2 24 01/07/2021    CALCIUM 9.1 01/07/2021    PROTEINTOT 6.9 01/07/2021    ALBUMIN 4.0 01/07/2021    ALT 13 01/07/2021    AST 14 (L) 01/07/2021    ALKPHOS 137 (H) 01/07/2021    BILITOT 0.41 01/07/2021    GLOB 2.9 01/07/2021    BCR 10 01/07/2021    ANIONGAP 14 01/07/2021      Lab Results   Component Value Date    CHOL 151 03/11/2022    CHLPL 144 09/03/2020    TRIG 184 (H) 03/11/2022    HDL 25 (L) 03/11/2022    LDL 94 03/11/2022     Lab Results   Component Value Date    TSH 1.730 03/11/2022     Lab Results   Component Value Date    HGBA1C 5.30 03/11/2022            Assessment and Plan:   Today, we have reviewed his care.  Alex's blood pressure is well controlled today.  He has also seen significant improvement with his anxiety since starting on buspirone.  For now, we will refill these medications as noted below.  We will plan to see him back in March as scheduled for an updated wellness exam.  No other short-term change is anticipated.       Diagnoses and all orders for this visit:    1. Primary hypertension  -     lisinopril (PRINIVIL,ZESTRIL) 20 MG tablet; Take 0.5 tablets by mouth Daily.  Dispense: 90 tablet; Refill: 0    2. Stress  -     busPIRone (BUSPAR) 5 MG tablet; Take 1 tablet by mouth 2 (Two) Times a Day.  Dispense: 180 tablet; Refill: 1          Follow Up   Return  in about 15 weeks (around 3/13/2023) for Recheck as scheduled.  Patient was given instructions and counseling regarding his condition or for health maintenance advice. Please see specific information pulled into the AVS if appropriate.

## 2022-12-22 DIAGNOSIS — R09.81 SINUS CONGESTION: ICD-10-CM

## 2022-12-22 RX ORDER — CHLORCYCLIZINE HYDROCHLORIDE AND PSEUDOEPHEDRINE HYDROCHLORIDE 25; 60 MG/1; MG/1
1 TABLET ORAL DAILY PRN
Qty: 60 TABLET | Refills: 1 | Status: SHIPPED | OUTPATIENT
Start: 2022-12-22

## 2023-01-12 ENCOUNTER — TELEPHONE (OUTPATIENT)
Dept: GASTROENTEROLOGY | Facility: CLINIC | Age: 43
End: 2023-01-12
Payer: COMMERCIAL

## 2023-01-26 ENCOUNTER — ANESTHESIA EVENT (OUTPATIENT)
Dept: GASTROENTEROLOGY | Facility: HOSPITAL | Age: 43
End: 2023-01-26
Payer: COMMERCIAL

## 2023-01-27 ENCOUNTER — HOSPITAL ENCOUNTER (OUTPATIENT)
Facility: HOSPITAL | Age: 43
Setting detail: HOSPITAL OUTPATIENT SURGERY
Discharge: HOME | End: 2023-01-27
Attending: INTERNAL MEDICINE | Admitting: INTERNAL MEDICINE
Payer: COMMERCIAL

## 2023-01-27 ENCOUNTER — ANESTHESIA (OUTPATIENT)
Dept: GASTROENTEROLOGY | Facility: HOSPITAL | Age: 43
End: 2023-01-27
Payer: COMMERCIAL

## 2023-01-27 VITALS
DIASTOLIC BLOOD PRESSURE: 91 MMHG | SYSTOLIC BLOOD PRESSURE: 124 MMHG | RESPIRATION RATE: 16 BRPM | TEMPERATURE: 97.1 F | HEART RATE: 67 BPM | WEIGHT: 224.87 LBS | BODY MASS INDEX: 27.38 KG/M2 | OXYGEN SATURATION: 95 %

## 2023-01-27 DIAGNOSIS — K50.80 CROHN'S DISEASE OF BOTH SMALL AND LARGE INTESTINE WITHOUT COMPLICATION: ICD-10-CM

## 2023-01-27 PROCEDURE — 45380 COLONOSCOPY AND BIOPSY: CPT | Performed by: INTERNAL MEDICINE

## 2023-01-27 PROCEDURE — 88341 IMHCHEM/IMCYTCHM EA ADD ANTB: CPT | Performed by: INTERNAL MEDICINE

## 2023-01-27 PROCEDURE — 88342 IMHCHEM/IMCYTCHM 1ST ANTB: CPT | Performed by: INTERNAL MEDICINE

## 2023-01-27 PROCEDURE — 88305 TISSUE EXAM BY PATHOLOGIST: CPT | Performed by: INTERNAL MEDICINE

## 2023-01-27 PROCEDURE — 25010000002 PROPOFOL 10 MG/ML EMULSION: Performed by: NURSE ANESTHETIST, CERTIFIED REGISTERED

## 2023-01-27 RX ORDER — PROPOFOL 10 MG/ML
VIAL (ML) INTRAVENOUS AS NEEDED
Status: DISCONTINUED | OUTPATIENT
Start: 2023-01-27 | End: 2023-01-27 | Stop reason: SURG

## 2023-01-27 RX ORDER — LIDOCAINE HYDROCHLORIDE 20 MG/ML
INJECTION, SOLUTION EPIDURAL; INFILTRATION; INTRACAUDAL; PERINEURAL AS NEEDED
Status: DISCONTINUED | OUTPATIENT
Start: 2023-01-27 | End: 2023-01-27 | Stop reason: SURG

## 2023-01-27 RX ORDER — SODIUM CHLORIDE, SODIUM LACTATE, POTASSIUM CHLORIDE, CALCIUM CHLORIDE 600; 310; 30; 20 MG/100ML; MG/100ML; MG/100ML; MG/100ML
30 INJECTION, SOLUTION INTRAVENOUS CONTINUOUS
Status: DISCONTINUED | OUTPATIENT
Start: 2023-01-27 | End: 2023-01-27 | Stop reason: HOSPADM

## 2023-01-27 RX ORDER — SODIUM CHLORIDE, SODIUM LACTATE, POTASSIUM CHLORIDE, CALCIUM CHLORIDE 600; 310; 30; 20 MG/100ML; MG/100ML; MG/100ML; MG/100ML
1000 INJECTION, SOLUTION INTRAVENOUS CONTINUOUS
Status: DISCONTINUED | OUTPATIENT
Start: 2023-01-27 | End: 2023-01-27 | Stop reason: HOSPADM

## 2023-01-27 RX ADMIN — PROPOFOL 100 MG: 10 INJECTION, EMULSION INTRAVENOUS at 07:25

## 2023-01-27 RX ADMIN — LIDOCAINE HYDROCHLORIDE 100 MG: 20 INJECTION, SOLUTION EPIDURAL; INFILTRATION; INTRACAUDAL; PERINEURAL at 07:25

## 2023-01-27 RX ADMIN — SODIUM CHLORIDE, POTASSIUM CHLORIDE, SODIUM LACTATE AND CALCIUM CHLORIDE: 600; 310; 30; 20 INJECTION, SOLUTION INTRAVENOUS at 07:25

## 2023-01-27 RX ADMIN — SODIUM CHLORIDE, POTASSIUM CHLORIDE, SODIUM LACTATE AND CALCIUM CHLORIDE 30 ML/HR: 600; 310; 30; 20 INJECTION, SOLUTION INTRAVENOUS at 06:29

## 2023-01-27 RX ADMIN — PROPOFOL 250 MCG/KG/MIN: 10 INJECTION, EMULSION INTRAVENOUS at 07:25

## 2023-01-27 NOTE — ANESTHESIA PREPROCEDURE EVALUATION
Anesthesia Evaluation     Patient summary reviewed and Nursing notes reviewed   no history of anesthetic complications:  NPO Solid Status: > 8 hours  NPO Liquid Status: > 2 hours           Airway   Mallampati: III  TM distance: >3 FB  Neck ROM: full  No difficulty expected  Dental      Pulmonary - negative pulmonary ROS and normal exam    breath sounds clear to auscultation  Cardiovascular - normal exam  Exercise tolerance: good (4-7 METS)    Rhythm: regular  Rate: normal    (+) hypertension well controlled,       Neuro/Psych- negative ROS  GI/Hepatic/Renal/Endo    (+)  GERD well controlled,      Musculoskeletal     Abdominal    Substance History - negative use     OB/GYN negative ob/gyn ROS         Other   arthritis,      ROS/Med Hx Other: PAT Nursing Notes unavailable.                   Anesthesia Plan    ASA 2     general     (Patient understands anesthesia not responsible for dental damage.)  intravenous induction     Anesthetic plan, risks, benefits, and alternatives have been provided, discussed and informed consent has been obtained with: patient.  Pre-procedure education provided  Plan discussed with CRNA.        CODE STATUS:

## 2023-01-27 NOTE — ANESTHESIA POSTPROCEDURE EVALUATION
Patient: Hi Amaya    Procedure Summary     Date: 01/27/23 Room / Location: [facility] ENDOSCOPY 3 / [facility]    Anesthesia Start: 0723 Anesthesia Stop: 0744    Procedure: COLONOSCOPY WITH BIOPSIES Diagnosis:       Crohn's disease of both small and large intestine without complication (HCC)      (Crohn's disease of both small and large intestine without complication (HCC) [K50.80])    Surgeons: Lorena Hendricks MD Provider: Gallo Alejandro MD    Anesthesia Type: general ASA Status: 2          Anesthesia Type: general    Vitals  Vitals Value Taken Time   /86 01/27/23 0752   Temp 36.4 °C (97.6 °F) 01/27/23 0742   Pulse 68 01/27/23 0754   Resp 17 01/27/23 0752   SpO2 95 % 01/27/23 0754   Vitals shown include unvalidated device data.        Post Anesthesia Care and Evaluation    Patient location during evaluation: bedside  Patient participation: complete - patient participated  Level of consciousness: awake  Pain management: adequate    Airway patency: patent  Anesthetic complications: No anesthetic complications  PONV Status: none  Cardiovascular status: acceptable and stable  Respiratory status: acceptable  Hydration status: acceptable    Comments: An Anesthesiologist personally participated in the most demanding procedures (including induction and emergence if applicable) in the anesthesia plan, monitored the course of anesthesia administration at frequent intervals and remained physically present and available for immediate diagnosis and treatment of emergencies.

## 2023-01-31 ENCOUNTER — TELEPHONE (OUTPATIENT)
Dept: GASTROENTEROLOGY | Facility: CLINIC | Age: 43
End: 2023-01-31
Payer: COMMERCIAL

## 2023-01-31 DIAGNOSIS — K50.80 CROHN'S DISEASE OF BOTH SMALL AND LARGE INTESTINE WITHOUT COMPLICATION: Primary | ICD-10-CM

## 2023-01-31 DIAGNOSIS — Z51.81 MEDICATION MONITORING ENCOUNTER: ICD-10-CM

## 2023-01-31 LAB
CYTO UR: NORMAL
LAB AP CASE REPORT: NORMAL
LAB AP CLINICAL INFORMATION: NORMAL
LAB AP SPECIAL STAINS: NORMAL
PATH REPORT.FINAL DX SPEC: NORMAL
PATH REPORT.GROSS SPEC: NORMAL

## 2023-01-31 NOTE — TELEPHONE ENCOUNTER
Spoke to pt and informed of Tonia GARBER result note and recommendations. F/u apt scheduled on 8/3/2023.  Pt verified understanding.     Pt states that his next injection is on 2/1/2023 and the following injection in 2 weeks. Pt states that he is planning to reach out with regards to cost of antibody testing. If estimate is provided and covered pt will get testing within the next or prior to next injection. Reports in the past he had antibody testing for Humira and insurance did not cover costing out of pocket over 2,000.

## 2023-01-31 NOTE — TELEPHONE ENCOUNTER
----- Message from JCARLOS Pittman sent at 1/31/2023 10:22 AM EST -----  Biopsies are consistent with ulceration in the terminal ileum.  Recommend to check levels of Cimzia and rule out antibody development.  I have placed orders.  Please instruct patient to obtain labs 1 to 2 days prior to next injection.  Schedule follow-up.

## 2023-02-10 ENCOUNTER — TELEPHONE (OUTPATIENT)
Dept: GASTROENTEROLOGY | Facility: CLINIC | Age: 43
End: 2023-02-10
Payer: COMMERCIAL

## 2023-02-10 NOTE — TELEPHONE ENCOUNTER
Contacted patient in regards to overdue results. Patient states he is waiting to hear from insurance company to tell him if it will be covered or not.

## 2023-03-06 DIAGNOSIS — I10 PRIMARY HYPERTENSION: ICD-10-CM

## 2023-03-06 RX ORDER — LISINOPRIL 20 MG/1
20 TABLET ORAL DAILY
Qty: 90 TABLET | Refills: 0 | Status: SHIPPED | OUTPATIENT
Start: 2023-03-06 | End: 2023-03-13 | Stop reason: SDUPTHER

## 2023-03-13 ENCOUNTER — OFFICE VISIT (OUTPATIENT)
Dept: FAMILY MEDICINE CLINIC | Age: 43
End: 2023-03-13
Payer: COMMERCIAL

## 2023-03-13 VITALS
BODY MASS INDEX: 28.42 KG/M2 | SYSTOLIC BLOOD PRESSURE: 144 MMHG | DIASTOLIC BLOOD PRESSURE: 95 MMHG | HEART RATE: 80 BPM | TEMPERATURE: 97.5 F | HEIGHT: 76 IN | WEIGHT: 233.4 LBS

## 2023-03-13 DIAGNOSIS — F41.1 GENERALIZED ANXIETY DISORDER: ICD-10-CM

## 2023-03-13 DIAGNOSIS — E78.2 MIXED HYPERLIPIDEMIA: ICD-10-CM

## 2023-03-13 DIAGNOSIS — I10 ESSENTIAL HYPERTENSION: ICD-10-CM

## 2023-03-13 DIAGNOSIS — Z00.00 PHYSICAL EXAM: Primary | ICD-10-CM

## 2023-03-13 PROCEDURE — 99396 PREV VISIT EST AGE 40-64: CPT | Performed by: FAMILY MEDICINE

## 2023-03-13 RX ORDER — BUSPIRONE HYDROCHLORIDE 10 MG/1
10 TABLET ORAL 2 TIMES DAILY
Qty: 180 TABLET | Refills: 3 | Status: SHIPPED | OUTPATIENT
Start: 2023-03-13

## 2023-03-13 RX ORDER — LISINOPRIL 20 MG/1
20 TABLET ORAL DAILY
Qty: 90 TABLET | Refills: 3 | Status: SHIPPED | OUTPATIENT
Start: 2023-03-13

## 2023-03-13 NOTE — PROGRESS NOTES
Hi Amaya presents to [facility].    Chief Complaint:  Annual physical    Subjective       History of Present Illness:  Alex is in today for annual physical.  He is 42 years old and works for the city of [place] where he has been for 20+ years.  He does not smoke after stopping relatively recently.  He estimates that he drinks about once per month.  He has Crohn's disease and is up-to-date on colonoscopy which he has periodically through gastroenterology.  He is not aware of any family history of prostate cancer.    He also has hypertension for which he remains on lisinopril.  His blood pressure is somewhat elevated here.  He also checks it at home and states the pressure tends to run high there as well.    He also has some ongoing issue with anxiety for which she is currently taking buspirone.  It is helpful to a point.  He is currently taking 10 mg in the morning, and he states that will be pretty effective up until the evening meal.  He is unsure about how to move forward.    Review of Systems:  Review of Systems   Constitutional: Negative for chills and fever.   Respiratory: Negative for cough and shortness of breath.    Cardiovascular: Negative for chest pain and palpitations.   Gastrointestinal: Negative for abdominal pain, nausea and vomiting.      Objective   Medical History:  Past Medical History:   • Ankylosing spondylitis (HCC)   • Crohn's disease (HCC)   • GERD (gastroesophageal reflux disease)   • Psoriasis   • Thoracic back pain     Past Surgical History:   • COLONOSCOPY   • COLONOSCOPY    Procedure: COLONOSCOPY WITH BIOPSIES;  Surgeon: Lorena Hendricks MD;  Location: [facility] ENDOSCOPY;  Service: Gastroenterology;  Laterality: N/A;  COLITIS, ILEITIS, PSEUDO POLYPS   • ENDOSCOPY   • SHOULDER SURGERY   • VASECTOMY      Family History   Problem Relation Age of Onset   • Stroke Paternal Grandfather    • Diabetes Paternal Grandfather    • Heart attack Paternal  "Grandfather    • Coronary artery disease Paternal Grandfather      Social History     Tobacco Use   • Smoking status: Some Days     Packs/day: 0.25     Years: 20.00     Pack years: 5.00     Types: Cigarettes   • Smokeless tobacco: Never   Substance Use Topics   • Alcohol use: Never       Health Maintenance Due   Topic Date Due   • Pneumococcal Vaccine 0-64 (1 - PCV) Never done   • LIPID PANEL  03/13/2023        Immunization History   Administered Date(s) Administered   • COVID-19 (CAROLYNN) 05/03/2021, 05/19/2021, 01/20/2022   • Flu Vaccine Intradermal Quad 18-64YR 11/01/2021   • Flu Vaccine Quad PF 6-35MO 10/01/2022   • Hepatitis B 01/07/2003, 02/25/2003, 10/07/2003   • Tdap 07/23/2013       Allergies   Allergen Reactions   • Amoxicillin Unknown - Low Severity   • Oxycodone-Acetaminophen Unknown - High Severity        Medications:  Current Outpatient Medications on File Prior to Visit   Medication Sig   • Certolizumab Pegol (Cimzia Starter Kit) 6 X 200 MG/ML kit inject 2 ml SQ  every 4 weeks as 2 equally divided injections given at 2 different injection sites in the abdomen or thigh   • Chlorcyclizine-Pseudoephed (Stahist AD) 25-60 MG tablet Take 1 tablet by mouth Daily As Needed (congestion).   • Misc Natural Products (SARSAPARILLA ROOT PO) Take 1,000 mg by mouth.   • multivitamin with minerals tablet tablet Take 1 tablet by mouth Daily.   • omeprazole (priLOSEC) 20 MG capsule Take 1 capsule by mouth Daily.   • [DISCONTINUED] busPIRone (BUSPAR) 5 MG tablet Take 1 tablet by mouth 2 (Two) Times a Day.   • [DISCONTINUED] lisinopril (PRINIVIL,ZESTRIL) 20 MG tablet Take 1 tablet by mouth Daily.     No current facility-administered medications on file prior to visit.       Vital Signs:   /96 (BP Location: Right arm, Patient Position: Sitting)   Pulse 68   Temp 97.5 °F (36.4 °C) (Oral)   Ht 193 cm (75.98\")   Wt 106 kg (233 lb 6.4 oz)   BMI 28.42 kg/m²     Physical Exam:  Physical Exam  Vitals and nursing note " reviewed.   Constitutional:       General: He is not in acute distress.     Appearance: He is not ill-appearing.   HENT:      Right Ear: Tympanic membrane and ear canal normal.      Left Ear: Tympanic membrane and ear canal normal.      Mouth/Throat:      Mouth: Mucous membranes are moist.      Comments: Pharynx appears normal  Eyes:      Extraocular Movements: Extraocular movements intact.      Pupils: Pupils are equal, round, and reactive to light.   Neck:      Thyroid: No thyromegaly.   Cardiovascular:      Rate and Rhythm: Normal rate and regular rhythm.      Heart sounds: No murmur heard.  Pulmonary:      Effort: Pulmonary effort is normal.      Breath sounds: Normal breath sounds.   Abdominal:      General: There is no distension.      Palpations: Abdomen is soft. There is no mass.      Tenderness: There is no abdominal tenderness.   Musculoskeletal:      Cervical back: Normal range of motion.   Skin:     Findings: No lesion or rash.   Neurological:      General: No focal deficit present.      Mental Status: He is oriented to person, place, and time.      Cranial Nerves: No cranial nerve deficit.   Psychiatric:         Mood and Affect: Mood normal.       Result Review      The following data was reviewed by Filiberto Saucedo MD on 03/13/2023.  Lab Results   Component Value Date    WBC 10.28 01/07/2021    HGB 14.30 01/07/2021    HCT 43.1 01/07/2021    MCV 86.7 01/07/2021    .00 01/07/2021     Lab Results   Component Value Date    GLUCOSE 89 01/07/2021    BUN 7 01/07/2021    CREATININE 0.68 (L) 01/07/2021     01/07/2021    K 4.0 01/07/2021     01/07/2021    CO2 24 01/07/2021    CALCIUM 9.1 01/07/2021    PROTEINTOT 6.9 01/07/2021    ALBUMIN 4.0 01/07/2021    ALT 13 01/07/2021    AST 14 (L) 01/07/2021    ALKPHOS 137 (H) 01/07/2021    BILITOT 0.41 01/07/2021    GLOB 2.9 01/07/2021    BCR 10 01/07/2021    ANIONGAP 14 01/07/2021      Lab Results   Component Value Date    CHOL 151 03/11/2022     CHLPL 144 09/03/2020    TRIG 184 (H) 03/11/2022    HDL 25 (L) 03/11/2022    LDL 94 03/11/2022     Lab Results   Component Value Date    TSH 1.730 03/11/2022     Lab Results   Component Value Date    HGBA1C 5.30 03/11/2022            Assessment and Plan:   Today, we have reviewed his care.  Alex is doing fairly well overall.  He has had some frustration regarding the autoimmune problems with which he deals.  Regarding his blood pressure, we will push lisinopril to 20 mg daily.  In addition, the anxiety issues are ongoing for him.  I will send a higher dose of buspirone for him at this time.  Hopefully, this will help him manage day to day better.  We will also update some blood work.  I may reach out to him in 6 weeks or so for another blood pressure check.  I would like to see the blood pressure run in the 120s or low 130s most of the time for the top number.       Diagnoses and all orders for this visit:    1. Physical exam (Primary)  -     TSH; Future  -     Lipid Panel; Future  -     Hemoglobin A1c; Future    2. Essential hypertension  -     lisinopril (PRINIVIL,ZESTRIL) 20 MG tablet; Take 1 tablet by mouth Daily.  Dispense: 90 tablet; Refill: 3    3. Generalized anxiety disorder  -     busPIRone (BUSPAR) 10 MG tablet; Take 1 tablet by mouth 2 (Two) Times a Day.  Dispense: 180 tablet; Refill: 3    4. Mixed hyperlipidemia  -     Lipid Panel; Future    Follow Up   Return in about 1 year (around 3/13/2024) for Recheck, Annual physical.  Patient was given instructions and counseling regarding his condition or for health maintenance advice. Please see specific information pulled into the AVS if appropriate.

## 2023-03-15 ENCOUNTER — LAB (OUTPATIENT)
Dept: LAB | Facility: HOSPITAL | Age: 43
End: 2023-03-15
Payer: COMMERCIAL

## 2023-03-15 DIAGNOSIS — Z51.81 MEDICATION MONITORING ENCOUNTER: ICD-10-CM

## 2023-03-15 DIAGNOSIS — K50.80 CROHN'S DISEASE OF BOTH SMALL AND LARGE INTESTINE WITHOUT COMPLICATION: ICD-10-CM

## 2023-03-15 DIAGNOSIS — Z00.00 PHYSICAL EXAM: ICD-10-CM

## 2023-03-15 DIAGNOSIS — E78.2 MIXED HYPERLIPIDEMIA: ICD-10-CM

## 2023-03-15 LAB
CHOLEST SERPL-MCNC: 163 MG/DL (ref 0–200)
HBA1C MFR BLD: 4.9 % (ref 4.8–5.6)
HDLC SERPL-MCNC: 31 MG/DL (ref 40–60)
LDLC SERPL CALC-MCNC: 106 MG/DL (ref 0–100)
LDLC/HDLC SERPL: 3.3 {RATIO}
TRIGL SERPL-MCNC: 148 MG/DL (ref 0–150)
TSH SERPL DL<=0.05 MIU/L-ACNC: 2.89 UIU/ML (ref 0.27–4.2)
VLDLC SERPL-MCNC: 26 MG/DL (ref 5–40)

## 2023-03-15 PROCEDURE — 84443 ASSAY THYROID STIM HORMONE: CPT

## 2023-03-15 PROCEDURE — 82397 CHEMILUMINESCENT ASSAY: CPT

## 2023-03-15 PROCEDURE — 83036 HEMOGLOBIN GLYCOSYLATED A1C: CPT

## 2023-03-15 PROCEDURE — 36415 COLL VENOUS BLD VENIPUNCTURE: CPT

## 2023-03-15 PROCEDURE — 80061 LIPID PANEL: CPT

## 2023-03-15 PROCEDURE — 80299 QUANTITATIVE ASSAY DRUG: CPT

## 2023-03-16 DIAGNOSIS — K21.9 GASTROESOPHAGEAL REFLUX DISEASE, UNSPECIFIED WHETHER ESOPHAGITIS PRESENT: ICD-10-CM

## 2023-03-16 RX ORDER — OMEPRAZOLE 20 MG/1
CAPSULE, DELAYED RELEASE ORAL
Qty: 90 CAPSULE | Refills: 1 | Status: SHIPPED | OUTPATIENT
Start: 2023-03-16

## 2023-03-16 NOTE — TELEPHONE ENCOUNTER
Pt is requesting omeprazole #90. Order pended.   Last ov: 9/7/22  Next ov: 8/3/23  Last refill: 9/7/22

## 2023-03-31 ENCOUNTER — OFFICE VISIT (OUTPATIENT)
Dept: FAMILY MEDICINE CLINIC | Age: 43
End: 2023-03-31
Payer: COMMERCIAL

## 2023-03-31 VITALS
SYSTOLIC BLOOD PRESSURE: 145 MMHG | HEIGHT: 76 IN | DIASTOLIC BLOOD PRESSURE: 81 MMHG | TEMPERATURE: 98.1 F | HEART RATE: 83 BPM | OXYGEN SATURATION: 98 % | WEIGHT: 232.8 LBS | BODY MASS INDEX: 28.35 KG/M2

## 2023-03-31 DIAGNOSIS — J02.9 SORE THROAT: ICD-10-CM

## 2023-03-31 DIAGNOSIS — R50.9 FEVER, UNSPECIFIED FEVER CAUSE: Primary | ICD-10-CM

## 2023-03-31 LAB
EXPIRATION DATE: NORMAL
EXPIRATION DATE: NORMAL
FLUAV AG UPPER RESP QL IA.RAPID: NOT DETECTED
FLUBV AG UPPER RESP QL IA.RAPID: NOT DETECTED
INTERNAL CONTROL: NORMAL
INTERNAL CONTROL: NORMAL
Lab: NORMAL
Lab: NORMAL
S PYO AG THROAT QL: NEGATIVE
SARS-COV-2 AG UPPER RESP QL IA.RAPID: NOT DETECTED

## 2023-03-31 PROCEDURE — 87880 STREP A ASSAY W/OPTIC: CPT | Performed by: FAMILY MEDICINE

## 2023-03-31 PROCEDURE — 87428 SARSCOV & INF VIR A&B AG IA: CPT | Performed by: FAMILY MEDICINE

## 2023-03-31 PROCEDURE — 87081 CULTURE SCREEN ONLY: CPT | Performed by: FAMILY MEDICINE

## 2023-03-31 PROCEDURE — U0004 COV-19 TEST NON-CDC HGH THRU: HCPCS | Performed by: FAMILY MEDICINE

## 2023-03-31 PROCEDURE — 99213 OFFICE O/P EST LOW 20 MIN: CPT | Performed by: FAMILY MEDICINE

## 2023-03-31 RX ORDER — CEFDINIR 300 MG/1
300 CAPSULE ORAL 2 TIMES DAILY
Qty: 20 CAPSULE | Refills: 0 | Status: SHIPPED | OUTPATIENT
Start: 2023-03-31

## 2023-03-31 NOTE — PROGRESS NOTES
Hi Amaya presents to [facility].    Chief Complaint:  Sinuses, sore throat    Subjective       History of Present Illness:  Alex is in today for evaluation of sore throat and fever.  He developed the symptoms just in the last 24 hours.  He has had a fever up to 101 degrees.  He has taken some Tylenol for this, and his temperature is normal here presently.  He had quite a bit of sinus drainage yesterday morning and that seems to have caused the sore throat.  He has had some ear discomfort with this he denies cough or new shortness of breath.    Review of Systems:  Review of Systems   Constitutional: Positive for fever. Negative for chills.   Respiratory: Negative for cough and shortness of breath.    Cardiovascular: Negative for chest pain and palpitations.   Gastrointestinal: Negative for abdominal pain, nausea and vomiting.        Objective   Medical History:  Past Medical History:   • Ankylosing spondylitis (HCC)   • Crohn's disease (HCC)   • GERD (gastroesophageal reflux disease)   • Psoriasis   • Thoracic back pain     Past Surgical History:   • COLONOSCOPY   • COLONOSCOPY    Procedure: COLONOSCOPY WITH BIOPSIES;  Surgeon: Lorena Hendricks MD;  Location: [facility] ENDOSCOPY;  Service: Gastroenterology;  Laterality: N/A;  COLITIS, ILEITIS, PSEUDO POLYPS   • ENDOSCOPY   • SHOULDER SURGERY   • VASECTOMY      Family History   Problem Relation Age of Onset   • Stroke Paternal Grandfather    • Diabetes Paternal Grandfather    • Heart attack Paternal Grandfather    • Coronary artery disease Paternal Grandfather      Social History     Tobacco Use   • Smoking status: Former     Packs/day: 0.25     Years: 20.00     Pack years: 5.00     Types: Cigarettes     Quit date: 2022     Years since quittin.3   • Smokeless tobacco: Never   Substance Use Topics   • Alcohol use: Never       There are no preventive care reminders to display for this patient.     Immunization History  "  Administered Date(s) Administered   • COVID-19 ([employer]) 05/03/2021, 05/19/2021, 01/20/2022   • Flu Vaccine Intradermal Quad 18-64YR 11/01/2021   • Flu Vaccine Quad PF 6-35MO 10/01/2022   • Hepatitis B Adult/Adolescent IM 01/07/2003, 02/25/2003, 10/07/2003   • Tdap 07/23/2013       Allergies   Allergen Reactions   • Amoxicillin Unknown - Low Severity   • Oxycodone-Acetaminophen Unknown - High Severity        Medications:  Current Outpatient Medications on File Prior to Visit   Medication Sig   • busPIRone (BUSPAR) 10 MG tablet Take 1 tablet by mouth 2 (Two) Times a Day.   • Certolizumab Pegol (Cimzia Starter Kit) 6 X 200 MG/ML kit inject 2 ml SQ  every 4 weeks as 2 equally divided injections given at 2 different injection sites in the abdomen or thigh   • Chlorcyclizine-Pseudoephed (Stahist AD) 25-60 MG tablet Take 1 tablet by mouth Daily As Needed (congestion).   • lisinopril (PRINIVIL,ZESTRIL) 20 MG tablet Take 1 tablet by mouth Daily.   • Misc Natural Products (SARSAPARILLA ROOT PO) Take 1,000 mg by mouth.   • multivitamin with minerals tablet tablet Take 1 tablet by mouth Daily.   • omeprazole (priLOSEC) 20 MG capsule TAKE ONE CAPSULE BY MOUTH EVERY DAY     No current facility-administered medications on file prior to visit.       Vital Signs:   /81 (BP Location: Left arm, Patient Position: Sitting)   Pulse 83   Temp 98.1 °F (36.7 °C) (Oral)   Ht 193 cm (75.98\")   Wt 106 kg (232 lb 12.8 oz)   SpO2 98%   BMI 28.35 kg/m²       Physical Exam:  Physical Exam  Vitals and nursing note reviewed.   Constitutional:       General: He is not in acute distress.     Appearance: He is not ill-appearing.   HENT:      Right Ear: Tympanic membrane and ear canal normal.      Left Ear: Tympanic membrane and ear canal normal.      Mouth/Throat:      Mouth: Mucous membranes are moist.      Pharynx: Posterior oropharyngeal erythema (Tonsils are mildly enlarged as well) present. No oropharyngeal exudate.      Comments: " Pharynx appears normal  Eyes:      Extraocular Movements: Extraocular movements intact.      Pupils: Pupils are equal, round, and reactive to light.   Neck:      Thyroid: No thyromegaly.   Cardiovascular:      Rate and Rhythm: Normal rate and regular rhythm.      Heart sounds: No murmur heard.  Pulmonary:      Effort: Pulmonary effort is normal.      Breath sounds: Normal breath sounds.   Abdominal:      General: There is no distension.      Palpations: Abdomen is soft. There is no mass.      Tenderness: There is no abdominal tenderness.   Musculoskeletal:      Cervical back: Normal range of motion.   Skin:     Findings: No lesion or rash.   Neurological:      General: No focal deficit present.      Mental Status: He is oriented to person, place, and time.      Cranial Nerves: No cranial nerve deficit.   Psychiatric:         Mood and Affect: Mood normal.         Result Review      The following data was reviewed by Filiberto Saucedo MD on 03/31/2023.  Lab Results   Component Value Date    WBC 10.28 01/07/2021    HGB 14.30 01/07/2021    HCT 43.1 01/07/2021    MCV 86.7 01/07/2021    .00 01/07/2021     Lab Results   Component Value Date    GLUCOSE 89 01/07/2021    BUN 7 01/07/2021    CREATININE 0.68 (L) 01/07/2021     01/07/2021    K 4.0 01/07/2021     01/07/2021    CO2 24 01/07/2021    CALCIUM 9.1 01/07/2021    PROTEINTOT 6.9 01/07/2021    ALBUMIN 4.0 01/07/2021    ALT 13 01/07/2021    AST 14 (L) 01/07/2021    ALKPHOS 137 (H) 01/07/2021    BILITOT 0.41 01/07/2021    GLOB 2.9 01/07/2021    BCR 10 01/07/2021    ANIONGAP 14 01/07/2021      Lab Results   Component Value Date    CHOL 163 03/15/2023    CHLPL 144 09/03/2020    TRIG 148 03/15/2023    HDL 31 (L) 03/15/2023     (H) 03/15/2023     Lab Results   Component Value Date    TSH 2.890 03/15/2023     Lab Results   Component Value Date    HGBA1C 4.90 03/15/2023              Assessment and Plan:   Today, we have reviewed his care.  Alex has  tested negative for COVID-19, flu, and strep on initial testing.  However, he is on immune suppressant medication, and his throat is showing evidence of infection.  This may be viral, but we will go ahead and cover as noted below.  COVID-19 PCR test will also be done as a precaution for the same reasons.  Hopefully, he will have a mild course with illness, but we will see how things progress.       Diagnoses and all orders for this visit:    1. Fever, unspecified fever cause (Primary)  -     COVID-19,APTIMA PANTHER(GONZALEZ),[facility], NP/OP SWAB IN UTM/VTM/SALINE TRANSPORT MEDIA,24 HR TAT - Swab, Nasopharynx  -     cefdinir (OMNICEF) 300 MG capsule; Take 1 capsule by mouth 2 (Two) Times a Day.  Dispense: 20 capsule; Refill: 0    2. Sore throat  -     POCT SARS-CoV-2 Antigen ARTEMIO + Flu  -     POCT rapid strep A  -     Beta Strep Culture, Throat - , Throat; Future  -     Beta Strep Culture, Throat - Swab, Throat  -     COVID-19,APTIMA PANTHER(GONZALEZ),[facility], NP/OP SWAB IN UTM/VTM/SALINE TRANSPORT MEDIA,24 HR TAT - Swab, Nasopharynx  -     cefdinir (OMNICEF) 300 MG capsule; Take 1 capsule by mouth 2 (Two) Times a Day.  Dispense: 20 capsule; Refill: 0    Follow Up   Return if symptoms worsen or fail to improve.  Patient was given instructions and counseling regarding his condition or for health maintenance advice. Please see specific information pulled into the AVS if appropriate.

## 2023-04-01 LAB — SARS-COV-2 RNA RESP QL NAA+PROBE: NOT DETECTED

## 2023-04-02 LAB — BACTERIA SPEC AEROBE CULT: NORMAL

## 2023-04-03 ENCOUNTER — TELEPHONE (OUTPATIENT)
Dept: GASTROENTEROLOGY | Facility: CLINIC | Age: 43
End: 2023-04-03
Payer: COMMERCIAL

## 2023-04-03 NOTE — TELEPHONE ENCOUNTER
Patient called in regards to his antibody blood test he was to have performed.  He said that his insurance informed him that this would not be covered.  He said that his rheumatologist switched his medication to Cimzia also and he wanted me to let you know.

## 2023-04-06 LAB
ANTI-CERTOLIZUMAB AB LEVEL: 655 NG/ML
CERTOLIZUMAB [MASS/VOLUME] IN SERUM OR PLASMA BY IMMUNOASSAY: 13 UG/ML

## 2023-04-07 ENCOUNTER — TELEPHONE (OUTPATIENT)
Dept: GASTROENTEROLOGY | Facility: CLINIC | Age: 43
End: 2023-04-07
Payer: COMMERCIAL

## 2023-04-07 NOTE — TELEPHONE ENCOUNTER
----- Message from JCARLOS Pittman sent at 4/7/2023  1:03 PM EDT -----  Please let pt know that labs show that he has developed antibodies to cimzia.  Does he have a f/u scheduled with rheumatology to discuss alternative tx options for AS?  Another option would be remicade that treats both Crohns and AS.

## 2023-04-07 NOTE — TELEPHONE ENCOUNTER
Patient notified, he said he followed up with them after his colonoscopy and does not have a follow up scheduled for 6 months.  He did say they left off with they were willing to follow whatever medication you recommend for him, he said he has been on Remicade in the past but it has been a few years.

## 2023-04-19 NOTE — TELEPHONE ENCOUNTER
Patient has developed antibodies to current treatment. I have reviewed his case with Dr. Hendricks.  I would like to start him on Skyrizi for treatment of his Crohn's disease.  He will need to follow-up with rheumatology to determine what they would like to do for treatment of his AS as he has already failed all of the combination therapy.  Please proceed with approval process for Skyrizi.

## 2023-04-20 NOTE — TELEPHONE ENCOUNTER
Called and spoke to patient about Aurea GARBER recommendations. Patient stated he would like to discuss with his Rheumatologist first. Will call back and give us an update once he has discussed this information with them.

## 2023-04-27 ENCOUNTER — TELEPHONE (OUTPATIENT)
Dept: GASTROENTEROLOGY | Facility: CLINIC | Age: 43
End: 2023-04-27
Payer: COMMERCIAL

## 2023-04-27 NOTE — TELEPHONE ENCOUNTER
Jazz from Dr Franky Rose office in [place] left a voice message today at 1243 stating patient called their office stating that Dr. Hendricks wanted to talk to Dr. Rose about patients biologic therapy. Dr. Rose will be in his office tomorrow between 8-12. She can call his cell phone number [phone]. Jazz would like to know about what time she will be calling him,so she can make sure he has his cell phone on. Her phone number is  ext [phone].

## 2023-05-22 ENCOUNTER — TELEPHONE (OUTPATIENT)
Dept: GASTROENTEROLOGY | Facility: CLINIC | Age: 43
End: 2023-05-22
Payer: COMMERCIAL

## 2023-05-22 DIAGNOSIS — K50.80 CROHN'S DISEASE OF BOTH SMALL AND LARGE INTESTINE WITHOUT COMPLICATION: Primary | ICD-10-CM

## 2023-05-22 RX ORDER — UPADACITINIB 45 MG/1
45 TABLET, EXTENDED RELEASE ORAL DAILY
Qty: 30 TABLET | Refills: 1 | Status: SHIPPED | OUTPATIENT
Start: 2023-05-22 | End: 2023-07-17

## 2023-05-22 NOTE — TELEPHONE ENCOUNTER
Patient is aware that he needs updated quant and Hep B.before PA can be initiated for Rinvoq. Labs ordered. Patient will send spouse to  samples.     Will start PA once lab results are back.

## 2023-05-22 NOTE — TELEPHONE ENCOUNTER
----- Message from JCARLOS Pittman sent at 5/22/2023 10:32 AM EDT -----  Regarding: plan of care  Dr. Hendricks spoke with patient's rheumatologist who recommended rinvoq for treatment of both Crohn's and AS.  It was recently approved for treatment of crohn's.  I would like for him to start on this (we have samples at the office that he can get until we get it approved).  I have entered the order for the loading dose.

## 2023-05-24 ENCOUNTER — LAB (OUTPATIENT)
Dept: LAB | Facility: HOSPITAL | Age: 43
End: 2023-05-24
Payer: COMMERCIAL

## 2023-05-24 DIAGNOSIS — K50.80 CROHN'S DISEASE OF BOTH SMALL AND LARGE INTESTINE WITHOUT COMPLICATION: ICD-10-CM

## 2023-05-24 PROCEDURE — 36415 COLL VENOUS BLD VENIPUNCTURE: CPT

## 2023-05-24 PROCEDURE — 86704 HEP B CORE ANTIBODY TOTAL: CPT

## 2023-05-24 PROCEDURE — 86480 TB TEST CELL IMMUN MEASURE: CPT

## 2023-05-25 LAB — HBV CORE AB SERPL QL IA: NEGATIVE

## 2023-05-26 LAB
GAMMA INTERFERON BACKGROUND BLD IA-ACNC: 0.09 IU/ML
M TB IFN-G BLD-IMP: NEGATIVE
M TB IFN-G CD4+ T-CELLS BLD-ACNC: 0.12 IU/ML
M TBIFN-G CD4+ CD8+T-CELLS BLD-ACNC: 0.08 IU/ML
MITOGEN IGNF BLD-ACNC: >10 IU/ML
QUANTIFERON INCUBATION: NORMAL
SERVICE CMNT-IMP: NORMAL

## 2023-08-03 ENCOUNTER — OFFICE VISIT (OUTPATIENT)
Dept: GASTROENTEROLOGY | Facility: CLINIC | Age: 43
End: 2023-08-03
Payer: COMMERCIAL

## 2023-08-03 VITALS
DIASTOLIC BLOOD PRESSURE: 85 MMHG | HEART RATE: 79 BPM | BODY MASS INDEX: 27.28 KG/M2 | HEIGHT: 76 IN | SYSTOLIC BLOOD PRESSURE: 141 MMHG | WEIGHT: 224 LBS

## 2023-08-03 DIAGNOSIS — K21.9 GASTROESOPHAGEAL REFLUX DISEASE, UNSPECIFIED WHETHER ESOPHAGITIS PRESENT: ICD-10-CM

## 2023-08-03 DIAGNOSIS — K50.80 CROHN'S DISEASE OF BOTH SMALL AND LARGE INTESTINE WITHOUT COMPLICATION: Primary | ICD-10-CM

## 2023-08-03 DIAGNOSIS — R14.0 ABDOMINAL BLOATING: ICD-10-CM

## 2023-08-03 DIAGNOSIS — Z51.81 MEDICATION MONITORING ENCOUNTER: ICD-10-CM

## 2023-08-03 RX ORDER — UPADACITINIB 45 MG/1
45 TABLET, EXTENDED RELEASE ORAL DAILY
Qty: 14 TABLET | Refills: 0 | Status: SHIPPED | OUTPATIENT
Start: 2023-08-03

## 2023-08-03 RX ORDER — OMEPRAZOLE 40 MG/1
40 CAPSULE, DELAYED RELEASE ORAL DAILY
Qty: 90 CAPSULE | Refills: 1 | Status: SHIPPED | OUTPATIENT
Start: 2023-08-03

## 2023-08-03 RX ORDER — OCTISALATE, AVOBENZONE, HOMOSALATE, AND OCTOCRYLENE 29.4; 29.4; 49; 25.48 MG/ML; MG/ML; MG/ML; MG/ML
4 LOTION TOPICAL DAILY
Qty: 30 CAPSULE | Refills: 5 | Status: SHIPPED | OUTPATIENT
Start: 2023-08-03

## 2023-08-04 ENCOUNTER — TELEPHONE (OUTPATIENT)
Dept: GASTROENTEROLOGY | Facility: CLINIC | Age: 43
End: 2023-08-04
Payer: COMMERCIAL

## 2023-08-17 ENCOUNTER — TELEPHONE (OUTPATIENT)
Dept: GASTROENTEROLOGY | Facility: CLINIC | Age: 43
End: 2023-08-17
Payer: COMMERCIAL

## 2023-08-23 DIAGNOSIS — F43.9 STRESS: ICD-10-CM

## 2023-08-23 RX ORDER — BUSPIRONE HYDROCHLORIDE 5 MG/1
TABLET ORAL
Qty: 60 TABLET | Refills: 1 | OUTPATIENT
Start: 2023-08-23

## 2023-09-14 ENCOUNTER — TELEPHONE (OUTPATIENT)
Dept: GASTROENTEROLOGY | Facility: CLINIC | Age: 43
End: 2023-09-14
Payer: COMMERCIAL

## 2023-09-14 NOTE — TELEPHONE ENCOUNTER
Spoke with Dr. Reyes.  He indicated that patient was experiencing abdominal bloating and nausea and stopped taking rinvoq.  Please check with patient to determine what dose of rinvoq he was taking and when he stopped it.  If he was taking the 45 mg, I would recommend that he reduce the dose to 15 mg to see if this improves side effects.

## 2023-09-14 NOTE — TELEPHONE ENCOUNTER
Dr Franky Reyes this patients rheumatologist would like for you to call him about this patient he doesn't have any patients from 1:15-2 but said you can call whenever is convenient for you, his number is [phone]

## 2023-09-15 ENCOUNTER — LAB (OUTPATIENT)
Dept: LAB | Facility: HOSPITAL | Age: 43
End: 2023-09-15
Payer: COMMERCIAL

## 2023-09-15 ENCOUNTER — CLINICAL SUPPORT (OUTPATIENT)
Dept: FAMILY MEDICINE CLINIC | Age: 43
End: 2023-09-15
Payer: COMMERCIAL

## 2023-09-15 VITALS — SYSTOLIC BLOOD PRESSURE: 144 MMHG | DIASTOLIC BLOOD PRESSURE: 89 MMHG | HEART RATE: 62 BPM

## 2023-09-15 DIAGNOSIS — K50.80 CROHN'S DISEASE OF BOTH SMALL AND LARGE INTESTINE WITHOUT COMPLICATION: ICD-10-CM

## 2023-09-15 LAB
25(OH)D3 SERPL-MCNC: 23.6 NG/ML (ref 30–100)
ALBUMIN SERPL-MCNC: 4.1 G/DL (ref 3.5–5.2)
ALBUMIN/GLOB SERPL: 1.9 G/DL
ALP SERPL-CCNC: 93 U/L (ref 39–117)
ALT SERPL W P-5'-P-CCNC: 10 U/L (ref 1–41)
ANION GAP SERPL CALCULATED.3IONS-SCNC: 12 MMOL/L (ref 5–15)
AST SERPL-CCNC: 11 U/L (ref 1–40)
BASOPHILS # BLD AUTO: 0.04 10*3/MM3 (ref 0–0.2)
BASOPHILS NFR BLD AUTO: 0.4 % (ref 0–1.5)
BILIRUB SERPL-MCNC: 0.2 MG/DL (ref 0–1.2)
BUN SERPL-MCNC: 6 MG/DL (ref 6–20)
BUN/CREAT SERPL: 7.5 (ref 7–25)
CALCIUM SPEC-SCNC: 9 MG/DL (ref 8.6–10.5)
CHLORIDE SERPL-SCNC: 108 MMOL/L (ref 98–107)
CO2 SERPL-SCNC: 25 MMOL/L (ref 22–29)
CREAT SERPL-MCNC: 0.8 MG/DL (ref 0.76–1.27)
CRP SERPL-MCNC: 0.64 MG/DL (ref 0–0.5)
DEPRECATED RDW RBC AUTO: 48.1 FL (ref 37–54)
EGFRCR SERPLBLD CKD-EPI 2021: 114.7 ML/MIN/1.73
EOSINOPHIL # BLD AUTO: 0.13 10*3/MM3 (ref 0–0.4)
EOSINOPHIL NFR BLD AUTO: 1.4 % (ref 0.3–6.2)
ERYTHROCYTE [DISTWIDTH] IN BLOOD BY AUTOMATED COUNT: 14.3 % (ref 12.3–15.4)
ERYTHROCYTE [SEDIMENTATION RATE] IN BLOOD: 9 MM/HR (ref 0–15)
GLOBULIN UR ELPH-MCNC: 2.2 GM/DL
GLUCOSE SERPL-MCNC: 100 MG/DL (ref 65–99)
HCT VFR BLD AUTO: 39.2 % (ref 37.5–51)
HGB BLD-MCNC: 12.6 G/DL (ref 13–17.7)
IMM GRANULOCYTES # BLD AUTO: 0.02 10*3/MM3 (ref 0–0.05)
IMM GRANULOCYTES NFR BLD AUTO: 0.2 % (ref 0–0.5)
IRON 24H UR-MRATE: 54 MCG/DL (ref 59–158)
IRON SATN MFR SERPL: 18 % (ref 20–50)
LYMPHOCYTES # BLD AUTO: 2.5 10*3/MM3 (ref 0.7–3.1)
LYMPHOCYTES NFR BLD AUTO: 27.5 % (ref 19.6–45.3)
MCH RBC QN AUTO: 28.8 PG (ref 26.6–33)
MCHC RBC AUTO-ENTMCNC: 32.1 G/DL (ref 31.5–35.7)
MCV RBC AUTO: 89.5 FL (ref 79–97)
MONOCYTES # BLD AUTO: 0.46 10*3/MM3 (ref 0.1–0.9)
MONOCYTES NFR BLD AUTO: 5.1 % (ref 5–12)
NEUTROPHILS NFR BLD AUTO: 5.94 10*3/MM3 (ref 1.7–7)
NEUTROPHILS NFR BLD AUTO: 65.4 % (ref 42.7–76)
PLATELET # BLD AUTO: 293 10*3/MM3 (ref 140–450)
PMV BLD AUTO: 11 FL (ref 6–12)
POTASSIUM SERPL-SCNC: 3.3 MMOL/L (ref 3.5–5.2)
PROT SERPL-MCNC: 6.3 G/DL (ref 6–8.5)
RBC # BLD AUTO: 4.38 10*6/MM3 (ref 4.14–5.8)
SODIUM SERPL-SCNC: 145 MMOL/L (ref 136–145)
TIBC SERPL-MCNC: 301 MCG/DL (ref 298–536)
TRANSFERRIN SERPL-MCNC: 202 MG/DL (ref 200–360)
WBC NRBC COR # BLD: 9.09 10*3/MM3 (ref 3.4–10.8)

## 2023-09-15 PROCEDURE — 86140 C-REACTIVE PROTEIN: CPT

## 2023-09-15 PROCEDURE — 84466 ASSAY OF TRANSFERRIN: CPT

## 2023-09-15 PROCEDURE — 82306 VITAMIN D 25 HYDROXY: CPT

## 2023-09-15 PROCEDURE — 83540 ASSAY OF IRON: CPT

## 2023-09-15 PROCEDURE — 80053 COMPREHEN METABOLIC PANEL: CPT

## 2023-09-15 PROCEDURE — 36415 COLL VENOUS BLD VENIPUNCTURE: CPT

## 2023-09-15 PROCEDURE — 85652 RBC SED RATE AUTOMATED: CPT

## 2023-09-15 PROCEDURE — 85025 COMPLETE CBC W/AUTO DIFF WBC: CPT

## 2023-09-15 NOTE — TELEPHONE ENCOUNTER
We had previously discussed skyrizi for treatment of crohns.  Can you do a benefits investigation to see if this is covered?

## 2023-09-15 NOTE — TELEPHONE ENCOUNTER
Spoke with patient. Last dose of Rinvoq 45mg was two and a half weeks ago. Patient is out of samples. He reports that when he spoke to human [employer] they informed him that his insurance will not cover any Tier 4 medications. Dr. Reyes started patient on Prednisone. Patient is requesting an alternative that insurance will cover. Does not want to continue Rinvoq at this time related to symptoms he reported to Dr. Reyes. Please advise.

## 2023-09-16 NOTE — PROGRESS NOTES
Vitals:    09/15/23 0822   BP: 144/89   BP Location: Left arm   Patient Position: Sitting   Cuff Size: Large Adult   Pulse: 62     Please let Alex know his blood pressure was mildly elevated yesterday.  It does not seem that the 20 mg dose of lisinopril is controlling his pressure adequately.  Unless he is having normal readings at home, I would lean toward adding treatment for blood pressure.  If he is agreeable, please send amlodipine 2.5 mg nightly #90 with 3 refills.  Again, this would be in ADDITION to lisinopril.  TICKLE for blood pressure check again in 6 weeks.  Let me know if he has other concerns.  If he would prefer to see me to talk about this and review his care, please make it so.  Thanks.

## 2023-09-18 ENCOUNTER — PATIENT MESSAGE (OUTPATIENT)
Dept: GASTROENTEROLOGY | Facility: CLINIC | Age: 43
End: 2023-09-18
Payer: COMMERCIAL

## 2023-09-18 NOTE — TELEPHONE ENCOUNTER
Spoke with Anjana at [employer]. No specialty drug over $1,000 will be covered through insurance. Patient would need to contact RX help center at [phone] and register for assistance.     Spoke with Adrianne at [employer]. Informed her that patient would have to call and register for assistance related to insurance.    Will contact patient via [email or web address] with information.

## 2023-09-19 ENCOUNTER — TELEPHONE (OUTPATIENT)
Dept: GASTROENTEROLOGY | Facility: CLINIC | Age: 43
End: 2023-09-19
Payer: COMMERCIAL

## 2023-09-19 DIAGNOSIS — E87.6 HYPOKALEMIA: Primary | ICD-10-CM

## 2023-09-19 RX ORDER — ERGOCALCIFEROL 1.25 MG/1
50000 CAPSULE ORAL
Qty: 12 CAPSULE | Refills: 1 | Status: SHIPPED | OUTPATIENT
Start: 2023-09-19

## 2023-09-19 RX ORDER — FERROUS SULFATE 325(65) MG
325 TABLET ORAL
Qty: 90 TABLET | Refills: 1 | Status: SHIPPED | OUTPATIENT
Start: 2023-09-19

## 2023-09-19 RX ORDER — POTASSIUM CHLORIDE 750 MG/1
10 TABLET, FILM COATED, EXTENDED RELEASE ORAL 2 TIMES DAILY
Qty: 4 TABLET | Refills: 0 | Status: SHIPPED | OUTPATIENT
Start: 2023-09-19

## 2023-09-19 NOTE — TELEPHONE ENCOUNTER
Patient notified of results and medication, he said he is not taking a diuretic or potassium supplement and has not been experiencing diarrhea.

## 2023-09-19 NOTE — TELEPHONE ENCOUNTER
----- Message from JCARLOS Pittman sent at 9/19/2023 12:35 PM EDT -----  Low vitamin D and iron.  Recommend that he take supplements.  Rx sent.  Potassium is also low.  Does he take any diuretics or a potassium supplement?  Has he been experiencing diarrhea?

## 2023-09-20 NOTE — TELEPHONE ENCOUNTER
Spoke to patient. He is aware that he needs to contact Rx help center related to Skyrizi. Patient voiced understanding. Encouraged patient to contact office with follow up on recommendations.

## 2023-10-26 ENCOUNTER — TELEPHONE (OUTPATIENT)
Dept: GASTROENTEROLOGY | Facility: CLINIC | Age: 43
End: 2023-10-26
Payer: COMMERCIAL

## 2023-11-01 ENCOUNTER — TELEPHONE (OUTPATIENT)
Dept: FAMILY MEDICINE CLINIC | Age: 43
End: 2023-11-01
Payer: COMMERCIAL

## 2023-11-01 NOTE — TELEPHONE ENCOUNTER
----- Message from Emerald Cisneros LPN sent at 9/20/2023  9:59 AM EDT -----  TICKLE for blood pressure check again in 6 weeks.

## 2023-12-13 ENCOUNTER — TELEPHONE (OUTPATIENT)
Dept: GASTROENTEROLOGY | Facility: CLINIC | Age: 43
End: 2023-12-13
Payer: COMMERCIAL

## 2023-12-13 NOTE — TELEPHONE ENCOUNTER
Jennifer pharmacist from [facility]'s contacted office stating patient's medication was ready to ship. Orders faxed to [facility] with number for them to contact to set up delivery of Remicade so they can infuse.

## 2023-12-15 NOTE — TELEPHONE ENCOUNTER
Spoke to Shaina at [facility]. She will contact [facility] at [phone] to schedule delivery of Remicade for patient to have infused.

## 2023-12-15 NOTE — TELEPHONE ENCOUNTER
Spoke to patient. He is aware that Shaina from [facility] is going to contact [facility] to schedule delivery of Remicade. Instructed patient to contact office if he had any further issues or concerns. Patient voiced understanding.

## 2023-12-29 ENCOUNTER — OFFICE VISIT (OUTPATIENT)
Dept: FAMILY MEDICINE CLINIC | Age: 43
End: 2023-12-29
Payer: COMMERCIAL

## 2023-12-29 VITALS
HEART RATE: 72 BPM | TEMPERATURE: 97 F | DIASTOLIC BLOOD PRESSURE: 86 MMHG | BODY MASS INDEX: 30.49 KG/M2 | SYSTOLIC BLOOD PRESSURE: 138 MMHG | HEIGHT: 76 IN | WEIGHT: 250.4 LBS

## 2023-12-29 DIAGNOSIS — R07.89 OTHER CHEST PAIN: ICD-10-CM

## 2023-12-29 DIAGNOSIS — F41.1 GENERALIZED ANXIETY DISORDER: Primary | ICD-10-CM

## 2023-12-29 DIAGNOSIS — R94.31 ABNORMAL FINDING ON EKG: ICD-10-CM

## 2023-12-29 PROCEDURE — 99214 OFFICE O/P EST MOD 30 MIN: CPT | Performed by: FAMILY MEDICINE

## 2023-12-29 PROCEDURE — 93000 ELECTROCARDIOGRAM COMPLETE: CPT | Performed by: FAMILY MEDICINE

## 2023-12-29 RX ORDER — INFLIXIMAB 100 MG/10ML
INJECTION, POWDER, LYOPHILIZED, FOR SOLUTION INTRAVENOUS ONCE
COMMUNITY
Start: 2023-12-26

## 2023-12-29 RX ORDER — SERTRALINE HYDROCHLORIDE 25 MG/1
25 TABLET, FILM COATED ORAL DAILY
Qty: 30 TABLET | Refills: 0 | Status: SHIPPED | OUTPATIENT
Start: 2023-12-29

## 2023-12-29 RX ORDER — PREDNISONE 5 MG/1
12.5 TABLET ORAL DAILY
COMMUNITY
Start: 2023-12-26

## 2023-12-29 NOTE — PROGRESS NOTES
Hi Amaya presents to [facility].    Chief Complaint:  Follow up anxiety    Subjective   History of Present Illness:  Alex is being seen today for follow-up on his care.  He has some degree of chronic anxiety that has been giving him more difficulty recently.  He states that his job is in somewhat of a limbo.  He is not sure yet what it will play out regarding it.  This has been weighing on him heavily though.  He says that he finds himself worrying quite a bit.  He has had some sadness as well.  He denies any suicidal ideation.  He is having some difficulty with sleep as well.  He has been drinking 2-3 times per week and states that he will call have 6 or so drinks per week.    Alex also had some issue with chest pain over the last month.  He says that he would have a mild chest pain that persisted most of the day.  It would actually feel better if he had a caleb to drink.  He thinks it may be related to anxiety.  This has been better in the last 2 weeks.  He denies any exertional chest pain.    Review of Systems:  Review of Systems   Constitutional:  Negative for chills and fever.   Respiratory:  Negative for cough and shortness of breath.    Cardiovascular:  Positive for chest pain. Negative for palpitations.   Gastrointestinal:  Negative for abdominal pain, nausea and vomiting.     Objective   Medical History:  Past Medical History:    Ankylosing spondylitis    Crohn's disease    GERD (gastroesophageal reflux disease)    Psoriasis    Thoracic back pain     Past Surgical History:    COLONOSCOPY    COLONOSCOPY    Procedure: COLONOSCOPY WITH BIOPSIES;  Surgeon: Lorena Hendricks MD;  Location: [facility] ENDOSCOPY;  Service: Gastroenterology;  Laterality: N/A;  COLITIS, ILEITIS, PSEUDO POLYPS    ENDOSCOPY    SHOULDER SURGERY    VASECTOMY      Family History   Problem Relation Age of Onset    Stroke Paternal Grandfather     Diabetes Paternal Grandfather     Heart attack  Paternal Grandfather     Coronary artery disease Paternal Grandfather      Social History     Tobacco Use    Smoking status: Former     Packs/day: 0.25     Years: 20.00     Additional pack years: 0.00     Total pack years: 5.00     Types: Cigarettes     Quit date: 2022     Years since quittin.0    Smokeless tobacco: Never   Substance Use Topics    Alcohol use: Never       Health Maintenance Due   Topic Date Due    TDAP/TD VACCINES (2 - Td or Tdap) 2023    INFLUENZA VACCINE  2023    COVID-19 Vaccine (4 -  season) 2023        Immunization History   Administered Date(s) Administered    COVID-19 ([employer]) 2021, 2021, 2022    Flu Vaccine Intradermal Quad 18-64YR 2021    Flu Vaccine Quad PF 6-35MO 10/01/2022    Hepatitis B Adult/Adolescent IM 2003, 2003, 10/07/2003    Tdap 2013       Allergies   Allergen Reactions    Amoxicillin Unknown - Low Severity    Oxycodone-Acetaminophen Unknown - High Severity        Medications:  Current Outpatient Medications on File Prior to Visit   Medication Sig    amLODIPine (NORVASC) 2.5 MG tablet Take 1 tablet by mouth Daily.    busPIRone (BUSPAR) 10 MG tablet Take 1 tablet by mouth 2 (Two) Times a Day.    Chlorcyclizine-Pseudoephed (Stahist AD) 25-60 MG tablet Take 1 tablet by mouth Daily As Needed (congestion).    lisinopril (PRINIVIL,ZESTRIL) 20 MG tablet Take 1 tablet by mouth Daily.    Misc Natural Products (SARSAPARILLA ROOT PO) Take 1,000 mg by mouth.    omeprazole (priLOSEC) 40 MG capsule Take 1 capsule by mouth Daily.    predniSONE (DELTASONE) 5 MG tablet Take 2.5 tablets by mouth Daily.    Remicade 100 MG injection Infuse  into a venous catheter 1 (One) Time.    [DISCONTINUED] cefdinir (OMNICEF) 300 MG capsule Take 1 capsule by mouth 2 (Two) Times a Day.    [DISCONTINUED] ferrous sulfate 325 (65 FE) MG tablet Take 1 tablet by mouth Daily With Breakfast.    [DISCONTINUED] multivitamin with minerals tablet  "tablet Take 1 tablet by mouth Daily.    [DISCONTINUED] potassium chloride 10 MEQ CR tablet Take 1 tablet by mouth 2 (Two) Times a Day.    [DISCONTINUED] Probiotic Product (Align) 4 MG capsule Take 4 mg by mouth Daily.    [DISCONTINUED] upadacitinib ER (Rinvoq) 45 MG tablet sustained-release 24 hour extended release tablet Take 1 tablet by mouth Daily.    [DISCONTINUED] vitamin D (ERGOCALCIFEROL) 1.25 MG (95262 UT) capsule capsule Take 1 capsule by mouth Every 7 (Seven) Days.     No current facility-administered medications on file prior to visit.       Vital Signs:   /86 (BP Location: Left arm, Patient Position: Sitting, Cuff Size: Adult)   Pulse 72   Temp 97 °F (36.1 °C) (Oral)   Ht 193 cm (76\")   Wt 114 kg (250 lb 6.4 oz)   BMI 30.48 kg/m²       Physical Exam:  Physical Exam  Vitals reviewed.   Constitutional:       General: He is not in acute distress.     Appearance: He is not ill-appearing.   Eyes:      Pupils: Pupils are equal, round, and reactive to light.   Neck:      Comments: No thyromegaly  Cardiovascular:      Rate and Rhythm: Normal rate and regular rhythm.   Pulmonary:      Effort: Pulmonary effort is normal.      Breath sounds: Normal breath sounds.   Abdominal:      General: There is no distension.      Palpations: Abdomen is soft.      Tenderness: There is no abdominal tenderness.   Musculoskeletal:      Cervical back: Neck supple.   Lymphadenopathy:      Cervical: No cervical adenopathy.   Skin:     Findings: No lesion or rash.   Neurological:      Mental Status: He is alert.   Psychiatric:         Mood and Affect: Mood normal.         Behavior: Behavior normal.         Result Review   The following data was reviewed by Filiberto Saucedo MD on 12/29/2023.  Lab Results   Component Value Date    WBC 9.09 09/15/2023    HGB 12.6 (L) 09/15/2023    HCT 39.2 09/15/2023    MCV 89.5 09/15/2023     09/15/2023     Lab Results   Component Value Date    GLUCOSE 100 (H) 09/15/2023    BUN 6 " 09/15/2023    CREATININE 0.80 09/15/2023     09/15/2023    K 3.3 (L) 09/15/2023     (H) 09/15/2023    CO2 25.0 09/15/2023    CALCIUM 9.0 09/15/2023    PROTEINTOT 6.3 09/15/2023    ALBUMIN 4.1 09/15/2023    ALT 10 09/15/2023    AST 11 09/15/2023    ALKPHOS 93 09/15/2023    BILITOT 0.2 09/15/2023    EGFR 114.7 09/15/2023    GLOB 2.2 09/15/2023    AGRATIO 1.9 09/15/2023    BCR 7.5 09/15/2023    ANIONGAP 12.0 09/15/2023      Lab Results   Component Value Date    CHOL 163 03/15/2023    CHLPL 144 09/03/2020    TRIG 148 03/15/2023    HDL 31 (L) 03/15/2023     (H) 03/15/2023     Lab Results   Component Value Date    TSH 2.890 03/15/2023     Lab Results   Component Value Date    HGBA1C 4.90 03/15/2023     ECG 12 Lead    Date/Time: 12/29/2023 3:26 PM  Performed by: Filiberto Saucedo MD    Authorized by: Filiberto Saucedo MD  Comparison: not compared with previous ECG   Previous ECG: no previous ECG available  Rhythm: sinus rhythm  Rate: normal  BPM: 74  Conduction: conduction normal  ST Segments: ST segments normal  T inversion: I and aVL  QRS axis: normal  Other findings comments: Possible delta wave    Clinical impression: abnormal EKG  Clinical impression comment: This is an abnormal EKG showing possible evidence of delta wave.  Nonspecific T wave findings in the lateral leads are also noted.  No comparison tracing is available.          Assessment and Plan:   Today, we have reviewed his care.  Alex has been struggling with anxiety for some time now.  There is an ongoing situation that may be a challenge for the foreseeable future.  Because of this, we will add a low-dose of sertraline and see if it is of benefit.  We will reach out to him in a few weeks to consider increasing the dose.  The chest pain that he describes sounds atypical.  It certainly could be related to anxiety, but we will move ahead with an EKG as a precaution as he goes.  Tentatively, he will follow-up in March as  scheduled.    ADDENDUM - Please let Alex know that there is not necessarily finding on the EKG that would suggest heart artery blockage or dangerous cause for the chest pain that we discussed.  However, the appearance of part of the EKG is abnormal and could in the right setting suggest an unusual issue called Danni-Parkinson-White syndrome.  I am not convinced that this is actually present, but I have placed a referral order for him to see cardiology as a precaution, and we should be in touch to schedule.    Diagnoses and all orders for this visit:    1. Generalized anxiety disorder (Primary)  -     sertraline (ZOLOFT) 25 MG tablet; Take 1 tablet by mouth Daily.  Dispense: 30 tablet; Refill: 0    2. Other chest pain  -     ECG 12 Lead    3. Abnormal finding on EKG    Follow Up  Return for Next scheduled follow up.  Patient was given instructions and counseling regarding his condition or for health maintenance advice. Please see specific information pulled into the AVS if appropriate.

## 2023-12-29 NOTE — Clinical Note
Please TICKLE to call him in 3 weeks.  Is sertraline helpful?  Should we consider increasing the dose on it?  Thanks. Ivermectin Pregnancy And Lactation Text: This medication is Pregnancy Category C and it isn't known if it is safe during pregnancy. It is also excreted in breast milk.

## 2024-01-09 ENCOUNTER — HOSPITAL ENCOUNTER (EMERGENCY)
Facility: HOSPITAL | Age: 44
Discharge: HOME | End: 2024-01-09
Admitting: EMERGENCY MEDICINE
Payer: COMMERCIAL

## 2024-01-09 ENCOUNTER — TELEPHONE (OUTPATIENT)
Dept: GASTROENTEROLOGY | Facility: CLINIC | Age: 44
End: 2024-01-09
Payer: COMMERCIAL

## 2024-01-09 VITALS
OXYGEN SATURATION: 95 % | BODY MASS INDEX: 30.9 KG/M2 | HEART RATE: 79 BPM | TEMPERATURE: 98 F | RESPIRATION RATE: 16 BRPM | DIASTOLIC BLOOD PRESSURE: 80 MMHG | HEIGHT: 76 IN | SYSTOLIC BLOOD PRESSURE: 131 MMHG | WEIGHT: 253.75 LBS

## 2024-01-09 DIAGNOSIS — K50.80 CROHN'S DISEASE OF BOTH SMALL AND LARGE INTESTINE WITHOUT COMPLICATION: Primary | ICD-10-CM

## 2024-01-09 DIAGNOSIS — T50.905A ADVERSE EFFECT OF DRUG, INITIAL ENCOUNTER: Primary | ICD-10-CM

## 2024-01-09 LAB
ALBUMIN SERPL-MCNC: 4 G/DL (ref 3.5–5.2)
ALBUMIN/GLOB SERPL: 1.5 G/DL
ALP SERPL-CCNC: 98 U/L (ref 39–117)
ALT SERPL W P-5'-P-CCNC: 13 U/L (ref 1–41)
ANION GAP SERPL CALCULATED.3IONS-SCNC: 11 MMOL/L (ref 5–15)
AST SERPL-CCNC: 13 U/L (ref 1–40)
BASOPHILS # BLD AUTO: 0.05 10*3/MM3 (ref 0–0.2)
BASOPHILS NFR BLD AUTO: 0.3 % (ref 0–1.5)
BILIRUB SERPL-MCNC: 0.7 MG/DL (ref 0–1.2)
BUN SERPL-MCNC: 8 MG/DL (ref 6–20)
BUN/CREAT SERPL: 10.8 (ref 7–25)
CALCIUM SPEC-SCNC: 8.5 MG/DL (ref 8.6–10.5)
CHLORIDE SERPL-SCNC: 106 MMOL/L (ref 98–107)
CO2 SERPL-SCNC: 24 MMOL/L (ref 22–29)
CREAT SERPL-MCNC: 0.74 MG/DL (ref 0.76–1.27)
DEPRECATED RDW RBC AUTO: 47.3 FL (ref 37–54)
EGFRCR SERPLBLD CKD-EPI 2021: 116.7 ML/MIN/1.73
EOSINOPHIL # BLD AUTO: 0.05 10*3/MM3 (ref 0–0.4)
EOSINOPHIL NFR BLD AUTO: 0.3 % (ref 0.3–6.2)
ERYTHROCYTE [DISTWIDTH] IN BLOOD BY AUTOMATED COUNT: 15 % (ref 12.3–15.4)
GLOBULIN UR ELPH-MCNC: 2.6 GM/DL
GLUCOSE SERPL-MCNC: 94 MG/DL (ref 65–99)
HCT VFR BLD AUTO: 43.5 % (ref 37.5–51)
HGB BLD-MCNC: 14.3 G/DL (ref 13–17.7)
IMM GRANULOCYTES # BLD AUTO: 0.07 10*3/MM3 (ref 0–0.05)
IMM GRANULOCYTES NFR BLD AUTO: 0.5 % (ref 0–0.5)
LYMPHOCYTES # BLD AUTO: 1.55 10*3/MM3 (ref 0.7–3.1)
LYMPHOCYTES NFR BLD AUTO: 10.6 % (ref 19.6–45.3)
MCH RBC QN AUTO: 28.3 PG (ref 26.6–33)
MCHC RBC AUTO-ENTMCNC: 32.9 G/DL (ref 31.5–35.7)
MCV RBC AUTO: 86.1 FL (ref 79–97)
MONOCYTES # BLD AUTO: 1.27 10*3/MM3 (ref 0.1–0.9)
MONOCYTES NFR BLD AUTO: 8.7 % (ref 5–12)
NEUTROPHILS NFR BLD AUTO: 11.65 10*3/MM3 (ref 1.7–7)
NEUTROPHILS NFR BLD AUTO: 79.6 % (ref 42.7–76)
NRBC BLD AUTO-RTO: 0 /100 WBC (ref 0–0.2)
PLATELET # BLD AUTO: 253 10*3/MM3 (ref 140–450)
PMV BLD AUTO: 11.4 FL (ref 6–12)
POTASSIUM SERPL-SCNC: 3.7 MMOL/L (ref 3.5–5.2)
PROT SERPL-MCNC: 6.6 G/DL (ref 6–8.5)
RBC # BLD AUTO: 5.05 10*6/MM3 (ref 4.14–5.8)
SODIUM SERPL-SCNC: 141 MMOL/L (ref 136–145)
WBC NRBC COR # BLD AUTO: 14.64 10*3/MM3 (ref 3.4–10.8)

## 2024-01-09 PROCEDURE — 85025 COMPLETE CBC W/AUTO DIFF WBC: CPT | Performed by: NURSE PRACTITIONER

## 2024-01-09 PROCEDURE — 80053 COMPREHEN METABOLIC PANEL: CPT | Performed by: NURSE PRACTITIONER

## 2024-01-09 PROCEDURE — 96360 HYDRATION IV INFUSION INIT: CPT

## 2024-01-09 PROCEDURE — 93005 ELECTROCARDIOGRAM TRACING: CPT | Performed by: NURSE PRACTITIONER

## 2024-01-09 PROCEDURE — 25810000003 SODIUM CHLORIDE 0.9 % SOLUTION: Performed by: NURSE PRACTITIONER

## 2024-01-09 PROCEDURE — 99283 EMERGENCY DEPT VISIT LOW MDM: CPT

## 2024-01-09 RX ADMIN — SODIUM CHLORIDE 1000 ML: 9 INJECTION, SOLUTION INTRAVENOUS at 13:51

## 2024-01-09 NOTE — TELEPHONE ENCOUNTER
[facility] pharmacy called in regards to pt. Pt was receiving remicade and had an allergic reaction. He was sent to the ED due to hypotensive and SOA.

## 2024-01-09 NOTE — ED PROVIDER NOTES
"Time: 1:32 PM EST  Date of encounter:  1/9/2024  Independent Historian/Clinical History and Information was obtained by:   Patient    History is limited by: N/A    Chief Complaint: Medication reaction      History of Present Illness:  Patient is a 43 y.o. year old male who presents to the emergency department for evaluation of adverse drug reaction.  Patient was receiving IV Remicade for Crohn's disease when 10 minutes into infusion he developed low blood pressure and started to \"blackout.\"  States blood pressure was 79/34.  States he started feeling warm all over prior to blacking out.  States symptoms lasted approximately 10 minutes and now he is feeling back to his normal self.        Patient Care Team  Primary Care Provider: Filiberto Saucedo MD    Past Medical History:     Allergies   Allergen Reactions    Amoxicillin Unknown - Low Severity    Oxycodone-Acetaminophen Unknown - High Severity     Past Medical History:   Diagnosis Date    Ankylosing spondylitis     Crohn's disease     GERD (gastroesophageal reflux disease)     Psoriasis     Thoracic back pain      Past Surgical History:   Procedure Laterality Date    COLONOSCOPY  2017, 2018    COLONOSCOPY N/A 1/27/2023    Procedure: COLONOSCOPY WITH BIOPSIES;  Surgeon: Lorena Hendricks MD;  Location: [facility] ENDOSCOPY;  Service: Gastroenterology;  Laterality: N/A;  COLITIS, ILEITIS, PSEUDO POLYPS    ENDOSCOPY  2017, 2018    SHOULDER SURGERY      VASECTOMY       Family History   Problem Relation Age of Onset    Stroke Paternal Grandfather     Diabetes Paternal Grandfather     Heart attack Paternal Grandfather     Coronary artery disease Paternal Grandfather        Home Medications:  Prior to Admission medications    Medication Sig Start Date End Date Taking? Authorizing Provider   amLODIPine (NORVASC) 2.5 MG tablet Take 1 tablet by mouth Daily. 9/20/23   Filiberto Saucedo MD   busPIRone (BUSPAR) 10 MG tablet Take 1 tablet by mouth 2 (Two) Times a " "Day. 3/13/23   Filiberto Saucedo MD   Chlorcyclizine-Pseudoephed (Stahist AD) 25-60 MG tablet Take 1 tablet by mouth Daily As Needed (congestion). 22   Filiberto Saucedo MD   lisinopril (PRINIVIL,ZESTRIL) 20 MG tablet Take 1 tablet by mouth Daily. 3/13/23   Filiberto Saucedo MD   Misc Natural Products (SARSAPARILLA ROOT PO) Take 1,000 mg by mouth.    Brenda Logan MD   omeprazole (priLOSEC) 40 MG capsule Take 1 capsule by mouth Daily. 8/3/23   Tonia Cannon APRN   predniSONE (DELTASONE) 5 MG tablet Take 2.5 tablets by mouth Daily. 23   Brenda Logan MD   Remicade 100 MG injection Infuse  into a venous catheter 1 (One) Time. 23   Brenda Logan MD   sertraline (ZOLOFT) 25 MG tablet Take 1 tablet by mouth Daily. 23   Filiberto Saucedo MD        Social History:   Social History     Tobacco Use    Smoking status: Former     Packs/day: 0.25     Years: 20.00     Additional pack years: 0.00     Total pack years: 5.00     Types: Cigarettes     Quit date: 2022     Years since quittin.0    Smokeless tobacco: Never   Vaping Use    Vaping Use: Never used   Substance Use Topics    Alcohol use: Never    Drug use: Never       Physical Exam:  /80 (BP Location: Right arm, Patient Position: Lying)   Pulse 79   Temp 98 °F (36.7 °C) (Oral)   Resp 16   Ht 193 cm (76\")   Wt 115 kg (253 lb 12 oz)   SpO2 95%   BMI 30.89 kg/m²     Physical Exam  Vitals and nursing note reviewed.   Constitutional:       General: He is not in acute distress.     Appearance: Normal appearance. He is not toxic-appearing.   HENT:      Head: Normocephalic and atraumatic.      Nose: Nose normal.      Mouth/Throat:      Mouth: Mucous membranes are moist.   Eyes:      Conjunctiva/sclera: Conjunctivae normal.   Cardiovascular:      Rate and Rhythm: Normal rate and regular rhythm.      Pulses: Normal pulses.      Heart sounds: Normal heart sounds.   Pulmonary:      " Effort: Pulmonary effort is normal.      Breath sounds: Normal breath sounds.   Abdominal:      General: Bowel sounds are normal.      Palpations: Abdomen is soft.      Tenderness: There is no abdominal tenderness.   Musculoskeletal:         General: Normal range of motion.      Cervical back: Normal range of motion.   Skin:     General: Skin is warm and dry.   Neurological:      General: No focal deficit present.      Mental Status: He is alert and oriented to person, place, and time.   Psychiatric:         Mood and Affect: Mood normal.         Behavior: Behavior normal.         Thought Content: Thought content normal.         Judgment: Judgment normal.                      Medical Decision Making:      Comorbidities that affect care:    Crohn's disease    External Notes reviewed:    None      The following orders were placed and all results were independently analyzed by me:  Orders Placed This Encounter   Procedures    Comprehensive Metabolic Panel    CBC Auto Differential    Orthostatic Vitals    ECG 12 Lead Syncope    CBC & Differential       Medications Given in the Emergency Department:  Medications   sodium chloride 0.9 % bolus 1,000 mL (0 mL Intravenous Stopped 1/9/24 1642)        ED Course:  1535: Patient reassessed and feeling much better.  Discussed ED findings and discharge plan.  Patient verbalized understanding agrees with plan.       Labs:    Lab Results (last 24 hours)       Procedure Component Value Units Date/Time    CBC & Differential [[id number]]  (Abnormal) Collected: 01/09/24 1347    Specimen: Blood Updated: 01/09/24 1355    Narrative:      The following orders were created for panel order CBC & Differential.  Procedure                               Abnormality         Status                     ---------                               -----------         ------                     CBC Auto Differential[[id number]]        Abnormal            Final result                 Please view results for  these tests on the individual orders.    Comprehensive Metabolic Panel [[id number]]  (Abnormal) Collected: 01/09/24 1347    Specimen: Blood Updated: 01/09/24 1410     Glucose 94 mg/dL      BUN 8 mg/dL      Creatinine 0.74 mg/dL      Sodium 141 mmol/L      Potassium 3.7 mmol/L      Chloride 106 mmol/L      CO2 24.0 mmol/L      Calcium 8.5 mg/dL      Total Protein 6.6 g/dL      Albumin 4.0 g/dL      ALT (SGPT) 13 U/L      AST (SGOT) 13 U/L      Alkaline Phosphatase 98 U/L      Total Bilirubin 0.7 mg/dL      Globulin 2.6 gm/dL      A/G Ratio 1.5 g/dL      BUN/Creatinine Ratio 10.8     Anion Gap 11.0 mmol/L      eGFR 116.7 mL/min/1.73     Narrative:      GFR Normal >60  Chronic Kidney Disease <60  Kidney Failure <15      CBC Auto Differential [[id number]]  (Abnormal) Collected: 01/09/24 1347    Specimen: Blood Updated: 01/09/24 1355     WBC 14.64 10*3/mm3      RBC 5.05 10*6/mm3      Hemoglobin 14.3 g/dL      Hematocrit 43.5 %      MCV 86.1 fL      MCH 28.3 pg      MCHC 32.9 g/dL      RDW 15.0 %      RDW-SD 47.3 fl      MPV 11.4 fL      Platelets 253 10*3/mm3      Neutrophil % 79.6 %      Lymphocyte % 10.6 %      Monocyte % 8.7 %      Eosinophil % 0.3 %      Basophil % 0.3 %      Immature Grans % 0.5 %      Neutrophils, Absolute 11.65 10*3/mm3      Lymphocytes, Absolute 1.55 10*3/mm3      Monocytes, Absolute 1.27 10*3/mm3      Eosinophils, Absolute 0.05 10*3/mm3      Basophils, Absolute 0.05 10*3/mm3      Immature Grans, Absolute 0.07 10*3/mm3      nRBC 0.0 /100 WBC              Imaging:    No Radiology Exams Resulted Within Past 24 Hours      Differential Diagnosis and Discussion:    Allergic Reaction: Differential diagnosis includes but is not limited to drug side effects, contact dermatitis, autoimmune conditions, infections, mast cell disorders, serum sickness, anaphylactoid reactions, angioedema, panic or anxiety attacks.    All labs were reviewed and interpreted by me.    [facility]           Patient Care  Considerations:    EPINEPHRINE: I considered giving epinephrine, however the patient has no respiratory distress, stridor and improved with treatment.      Consultants/Shared Management Plan:    None    Social Determinants of Health:    Patient is independent, reliable, and has access to care.       Disposition and Care Coordination:    Discharged: The patient is suitable and stable for discharge with no need for consideration of observation or admission.        Final diagnoses:   Adverse effect of drug, initial encounter        ED Disposition       ED Disposition   Discharge    Condition   Stable    Comment   --               This medical record created using voice recognition software.             Rizwana Vila, APRN  01/09/24 1737

## 2024-01-09 NOTE — DISCHARGE INSTRUCTIONS
Discussed with your doctor possible allergy versus side effects of Remicade.  Return to ED for any new or worsening symptoms.

## 2024-01-09 NOTE — TELEPHONE ENCOUNTER
Agree with ED evaluation.  Recommend to refer patient to [facility] gastroenterology for management of IBD.  He has tried and failed multiple medications without success.

## 2024-01-10 LAB
QT INTERVAL: 441 MS
QTC INTERVAL: 483 MS

## 2024-01-19 ENCOUNTER — TELEPHONE (OUTPATIENT)
Dept: FAMILY MEDICINE CLINIC | Age: 44
End: 2024-01-19
Payer: COMMERCIAL

## 2024-01-19 DIAGNOSIS — F41.1 GENERALIZED ANXIETY DISORDER: ICD-10-CM

## 2024-01-19 NOTE — TELEPHONE ENCOUNTER
Please let Alex know that I have sent the 50 mg dose of sertraline to REPLACE the current 25 mg dose.  This is a relatively small increase.  If he does well with this, I would recommend staying on the 50 mg dose for at least a year and reassessing.  Let me know if he has concerns.  Thanks.

## 2024-01-19 NOTE — TELEPHONE ENCOUNTER
----- Message from Emerald Cisneros LPN sent at 12/29/2023  3:28 PM EST -----   TICKLE to call him in 3 weeks.  Is sertraline helpful?  Should we consider increasing the dose on it?

## 2024-01-26 ENCOUNTER — OFFICE VISIT (OUTPATIENT)
Dept: CARDIOLOGY | Facility: CLINIC | Age: 44
End: 2024-01-26
Payer: COMMERCIAL

## 2024-01-26 VITALS
BODY MASS INDEX: 31.71 KG/M2 | WEIGHT: 260.4 LBS | SYSTOLIC BLOOD PRESSURE: 114 MMHG | HEIGHT: 76 IN | DIASTOLIC BLOOD PRESSURE: 78 MMHG | HEART RATE: 80 BPM

## 2024-01-26 DIAGNOSIS — K21.9 GASTROESOPHAGEAL REFLUX DISEASE, UNSPECIFIED WHETHER ESOPHAGITIS PRESENT: ICD-10-CM

## 2024-01-26 DIAGNOSIS — R07.89 CHEST PAIN, ATYPICAL: ICD-10-CM

## 2024-01-26 DIAGNOSIS — I10 ESSENTIAL HYPERTENSION: ICD-10-CM

## 2024-01-26 DIAGNOSIS — I45.6 WOLFF-PARKINSON-WHITE (WPW) PATTERN SEEN ON ELECTROCARDIOGRAPHY: Primary | ICD-10-CM

## 2024-01-26 DIAGNOSIS — Z82.49 FAMILY HISTORY OF ISCHEMIC HEART DISEASE (IHD): ICD-10-CM

## 2024-01-26 PROCEDURE — 99204 OFFICE O/P NEW MOD 45 MIN: CPT | Performed by: INTERNAL MEDICINE

## 2024-01-26 RX ORDER — OMEPRAZOLE 40 MG/1
40 CAPSULE, DELAYED RELEASE ORAL DAILY
Qty: 90 CAPSULE | Refills: 1 | Status: SHIPPED | OUTPATIENT
Start: 2024-01-26

## 2024-01-26 NOTE — PROGRESS NOTES
Chief Complaint  Abnormal ECG      History of Present Illness  Hi Amaya presents to [facility] CARDIOLOGY    This is a very pleasant 43-year-old gentleman with hypertension, family history of ischemic heart disease presents to clinic for cardiac evaluation.  He had an ECG done which demonstrated WPW pattern and was referred to us for further evaluation.  He denies any palpitations, dizziness, presyncope or syncope.  He has sporadic episodes of chest discomfort which he attributes to anxiety/stress.  He denies associated symptoms.  He has no dyspnea or other complaints.  He denies family history of premature or unexpected cardiac death.    Past Medical History:   Diagnosis Date    Ankylosing spondylitis     Crohn's disease     GERD (gastroesophageal reflux disease)     Psoriasis     Thoracic back pain          Current Outpatient Medications:     lisinopril (PRINIVIL,ZESTRIL) 20 MG tablet, Take 1 tablet by mouth Daily., Disp: 90 tablet, Rfl: 3    Misc Natural Products (SARSAPARILLA ROOT PO), Take 1,000 mg by mouth., Disp: , Rfl:     omeprazole (priLOSEC) 40 MG capsule, TAKE ONE CAPSULE BY MOUTH EVERY DAY, Disp: 90 capsule, Rfl: 1    predniSONE (DELTASONE) 5 MG tablet, Take 2.5 tablets by mouth Daily., Disp: , Rfl:     sertraline (ZOLOFT) 50 MG tablet, Take 1 tablet by mouth Daily., Disp: 90 tablet, Rfl: 3    Medications Discontinued During This Encounter   Medication Reason    amLODIPine (NORVASC) 2.5 MG tablet *Therapy completed    busPIRone (BUSPAR) 10 MG tablet *Therapy completed    Chlorcyclizine-Pseudoephed (Stahist AD) 25-60 MG tablet *Therapy completed    Remicade 100 MG injection *Therapy completed     Allergies   Allergen Reactions    Amoxicillin Unknown - Low Severity    Oxycodone-Acetaminophen Unknown - High Severity    Remicade [Infliximab] Unknown - High Severity        Social History     Tobacco Use    Smoking status: Former     Packs/day: 0.25     Years: 20.00     Additional  "pack years: 0.00     Total pack years: 5.00     Types: Cigarettes     Quit date: 2022     Years since quittin.1    Smokeless tobacco: Never   Vaping Use    Vaping Use: Never used   Substance Use Topics    Alcohol use: Never    Drug use: Never       Family History   Problem Relation Age of Onset    Stroke Paternal Grandfather     Diabetes Paternal Grandfather     Heart attack Paternal Grandfather     Coronary artery disease Paternal Grandfather         Objective     /78   Pulse 80   Ht 193 cm (75.98\")   Wt 118 kg (260 lb 6.4 oz)   BMI 31.71 kg/m²       Physical Exam  Constitutional:       General: Awake. Not in acute distress.     Appearance: Normal appearance.   Neck:      Vascular: No carotid bruit, hepatojugular reflux or JVD.   Cardiovascular:      Rate and Rhythm: Normal rate and regular rhythm.      Chest Wall: PMI is not displaced.      Heart sounds: Normal heart sounds, S1 normal and S2 normal. No murmur heard.   No friction rub. No gallop. No S3 or S4 sounds.    Pulmonary:      Effort: Pulmonary effort is normal.      Breath sounds: Normal breath sounds. No wheezing, rhonchi or rales.   Ext.      Right lower leg: No edema.      Left lower leg: No edema.   Skin:     General: Skin is warm and dry.      Coloration: Skin is not cyanotic.      Findings: No petechiae or rash.   Neurological:      Mental Status: Alert and oriented x 3  Psychiatric:         Behavior: Behavior is cooperative.       Result Review :     No results found for: \"PROBNP\"  CMP          9/15/2023    08:33 2024    13:47   CMP   Glucose 100  94    BUN 6  8    Creatinine 0.80  0.74    EGFR 114.7  116.7    Sodium 145  141    Potassium 3.3  3.7    Chloride 108  106    Calcium 9.0  8.5    Total Protein 6.3  6.6    Albumin 4.1  4.0    Globulin 2.2  2.6    Total Bilirubin 0.2  0.7    Alkaline Phosphatase 93  98    AST (SGOT) 11  13    ALT (SGPT) 10  13    Albumin/Globulin Ratio 1.9  1.5    BUN/Creatinine Ratio 7.5  10.8  " "  Anion Gap 12.0  11.0      CBC w/diff          9/15/2023    08:33 1/9/2024    13:47   CBC w/Diff   WBC 9.09  14.64    RBC 4.38  5.05    Hemoglobin 12.6  14.3    Hematocrit 39.2  43.5    MCV 89.5  86.1    MCH 28.8  28.3    MCHC 32.1  32.9    RDW 14.3  15.0    Platelets 293  253    Neutrophil Rel % 65.4  79.6    Immature Granulocyte Rel % 0.2  0.5    Lymphocyte Rel % 27.5  10.6    Monocyte Rel % 5.1  8.7    Eosinophil Rel % 1.4  0.3    Basophil Rel % 0.4  0.3       Lab Results   Component Value Date    TSH 2.890 03/15/2023      Lab Results   Component Value Date    FREET4 1.1 01/07/2021      No results found for: \"DDIMERQUANT\"  No results found for: \"MG\"   No results found for: \"DIGOXIN\"   No results found for: \"TROPONINT\"   No results found for: \"POCTROP\"(       Lipid Panel          3/15/2023    07:40   Lipid Panel   Total Cholesterol 163    Triglycerides 148    HDL Cholesterol 31    VLDL Cholesterol 26    LDL Cholesterol  106    LDL/HDL Ratio 3.30                No results found for this or any previous visit.                Diagnoses and all orders for this visit:    1. Danni-Parkinson-White (WPW) pattern seen on electrocardiography (Primary)  -     Cardiac Event Monitor; Future    2. Chest pain, atypical  -     Treadmill Stress Test; Future  -     Adult Transthoracic Echo Complete W/ Cont if Necessary Per Protocol; Future    3. Essential hypertension    4. Family history of ischemic heart disease (IHD)      Assessment:    WPW pattern: His ECG demonstrates WPW pattern.  He has no symptoms to suggest WPW syndrome.  He denies palpitations, dizziness, presyncope or syncope.  I will check a 4-week event monitor to assess for concealed tachyarrhythmias.  Echo will be done for LVEF and to rule out underlying structural or valvular pathology.    Atypical chest pain: Treadmill stress test will be done to rule out exercise-induced ischemic ST-T changes.    Essential hypertension: Well-controlled on lisinopril.  Continue " the same.    Family history of ischemic heart disease: Testing will be done as discussed above.    Follow Up       Return for Return to clinic after diagnostic testing, With Janice GARBER.    Patient was given instructions and counseling regarding his condition or for health maintenance advice. Please see specific information pulled into the AVS if appropriate.

## 2024-03-07 ENCOUNTER — TELEPHONE (OUTPATIENT)
Dept: GASTROENTEROLOGY | Facility: CLINIC | Age: 44
End: 2024-03-07
Payer: COMMERCIAL

## 2024-03-07 DIAGNOSIS — K50.80 CROHN'S DISEASE OF BOTH SMALL AND LARGE INTESTINE WITHOUT COMPLICATION: Primary | ICD-10-CM

## 2024-03-07 NOTE — TELEPHONE ENCOUNTER
Patient left a voicemail that he is having an active crohn's flare, he wanted to know if you could send something in for him to help with this.  He has an appointment scheduled with [facility] gastro but it is not until 5/14/24, please advise.

## 2024-03-08 ENCOUNTER — LAB (OUTPATIENT)
Dept: LAB | Facility: HOSPITAL | Age: 44
End: 2024-03-08
Payer: COMMERCIAL

## 2024-03-08 DIAGNOSIS — K50.80 CROHN'S DISEASE OF BOTH SMALL AND LARGE INTESTINE WITHOUT COMPLICATION: ICD-10-CM

## 2024-03-08 LAB
BASOPHILS # BLD AUTO: 0.07 10*3/MM3 (ref 0–0.2)
BASOPHILS NFR BLD AUTO: 0.5 % (ref 0–1.5)
DEPRECATED RDW RBC AUTO: 44.2 FL (ref 37–54)
EOSINOPHIL # BLD AUTO: 0.01 10*3/MM3 (ref 0–0.4)
EOSINOPHIL NFR BLD AUTO: 0.1 % (ref 0.3–6.2)
ERYTHROCYTE [DISTWIDTH] IN BLOOD BY AUTOMATED COUNT: 13.8 % (ref 12.3–15.4)
HCT VFR BLD AUTO: 42.9 % (ref 37.5–51)
HGB BLD-MCNC: 13.7 G/DL (ref 13–17.7)
IMM GRANULOCYTES # BLD AUTO: 0.07 10*3/MM3 (ref 0–0.05)
IMM GRANULOCYTES NFR BLD AUTO: 0.5 % (ref 0–0.5)
LYMPHOCYTES # BLD AUTO: 1.6 10*3/MM3 (ref 0.7–3.1)
LYMPHOCYTES NFR BLD AUTO: 11.5 % (ref 19.6–45.3)
MCH RBC QN AUTO: 28.1 PG (ref 26.6–33)
MCHC RBC AUTO-ENTMCNC: 31.9 G/DL (ref 31.5–35.7)
MCV RBC AUTO: 87.9 FL (ref 79–97)
MONOCYTES # BLD AUTO: 0.98 10*3/MM3 (ref 0.1–0.9)
MONOCYTES NFR BLD AUTO: 7 % (ref 5–12)
NEUTROPHILS NFR BLD AUTO: 11.19 10*3/MM3 (ref 1.7–7)
NEUTROPHILS NFR BLD AUTO: 80.4 % (ref 42.7–76)
NRBC BLD AUTO-RTO: 0 /100 WBC (ref 0–0.2)
PLATELET # BLD AUTO: 396 10*3/MM3 (ref 140–450)
PMV BLD AUTO: 11 FL (ref 6–12)
RBC # BLD AUTO: 4.88 10*6/MM3 (ref 4.14–5.8)
WBC NRBC COR # BLD AUTO: 13.92 10*3/MM3 (ref 3.4–10.8)

## 2024-03-08 PROCEDURE — 86140 C-REACTIVE PROTEIN: CPT

## 2024-03-08 PROCEDURE — 36415 COLL VENOUS BLD VENIPUNCTURE: CPT

## 2024-03-08 PROCEDURE — 85025 COMPLETE CBC W/AUTO DIFF WBC: CPT

## 2024-03-08 PROCEDURE — 83540 ASSAY OF IRON: CPT

## 2024-03-08 PROCEDURE — 80053 COMPREHEN METABOLIC PANEL: CPT

## 2024-03-08 PROCEDURE — 85652 RBC SED RATE AUTOMATED: CPT

## 2024-03-08 PROCEDURE — 82306 VITAMIN D 25 HYDROXY: CPT

## 2024-03-08 PROCEDURE — 84466 ASSAY OF TRANSFERRIN: CPT

## 2024-03-08 NOTE — TELEPHONE ENCOUNTER
Patient is having severe diarrhea and abdominal cramping, I advised him to get stool studies and labs done asap and he verbalized understanding.

## 2024-03-09 LAB
25(OH)D3 SERPL-MCNC: 23.9 NG/ML (ref 30–100)
ALBUMIN SERPL-MCNC: 4.1 G/DL (ref 3.5–5.2)
ALBUMIN/GLOB SERPL: 1.5 G/DL
ALP SERPL-CCNC: 115 U/L (ref 39–117)
ALT SERPL W P-5'-P-CCNC: 12 U/L (ref 1–41)
ANION GAP SERPL CALCULATED.3IONS-SCNC: 14.1 MMOL/L (ref 5–15)
AST SERPL-CCNC: 11 U/L (ref 1–40)
BILIRUB SERPL-MCNC: 0.3 MG/DL (ref 0–1.2)
BUN SERPL-MCNC: 5 MG/DL (ref 6–20)
BUN/CREAT SERPL: 6 (ref 7–25)
CALCIUM SPEC-SCNC: 8.7 MG/DL (ref 8.6–10.5)
CHLORIDE SERPL-SCNC: 103 MMOL/L (ref 98–107)
CO2 SERPL-SCNC: 23.9 MMOL/L (ref 22–29)
CREAT SERPL-MCNC: 0.84 MG/DL (ref 0.76–1.27)
CRP SERPL-MCNC: 5.38 MG/DL (ref 0–0.5)
EGFRCR SERPLBLD CKD-EPI 2021: 112.4 ML/MIN/1.73
ERYTHROCYTE [SEDIMENTATION RATE] IN BLOOD: 13 MM/HR (ref 0–15)
GLOBULIN UR ELPH-MCNC: 2.8 GM/DL
GLUCOSE SERPL-MCNC: 107 MG/DL (ref 65–99)
IRON 24H UR-MRATE: 22 MCG/DL (ref 59–158)
IRON SATN MFR SERPL: 7 % (ref 20–50)
POTASSIUM SERPL-SCNC: 3.3 MMOL/L (ref 3.5–5.2)
PROT SERPL-MCNC: 6.9 G/DL (ref 6–8.5)
SODIUM SERPL-SCNC: 141 MMOL/L (ref 136–145)
TIBC SERPL-MCNC: 294 MCG/DL (ref 298–536)
TRANSFERRIN SERPL-MCNC: 197 MG/DL (ref 200–360)

## 2024-03-11 ENCOUNTER — TELEPHONE (OUTPATIENT)
Dept: GASTROENTEROLOGY | Facility: CLINIC | Age: 44
End: 2024-03-11
Payer: COMMERCIAL

## 2024-03-11 ENCOUNTER — LAB (OUTPATIENT)
Dept: LAB | Facility: HOSPITAL | Age: 44
End: 2024-03-11
Payer: COMMERCIAL

## 2024-03-11 ENCOUNTER — TELEPHONE (OUTPATIENT)
Dept: CARDIOLOGY | Facility: CLINIC | Age: 44
End: 2024-03-11
Payer: COMMERCIAL

## 2024-03-11 DIAGNOSIS — K50.80 CROHN'S DISEASE OF BOTH SMALL AND LARGE INTESTINE WITHOUT COMPLICATION: ICD-10-CM

## 2024-03-11 LAB
027 TOXIN: NORMAL
C COLI+JEJ+UPSA DNA STL QL NAA+NON-PROBE: NOT DETECTED
C DIFF TOX GENS STL QL NAA+PROBE: NEGATIVE
EC STX1+STX2 GENES STL QL NAA+NON-PROBE: NOT DETECTED
S ENT+BONG DNA STL QL NAA+NON-PROBE: NOT DETECTED
SHIGELLA SP+EIEC IPAH ST NAA+NON-PROBE: NOT DETECTED

## 2024-03-11 PROCEDURE — 87506 IADNA-DNA/RNA PROBE TQ 6-11: CPT

## 2024-03-11 PROCEDURE — 87493 C DIFF AMPLIFIED PROBE: CPT

## 2024-03-11 PROCEDURE — 83993 ASSAY FOR CALPROTECTIN FECAL: CPT

## 2024-03-11 RX ORDER — ERGOCALCIFEROL 1.25 MG/1
50000 CAPSULE ORAL
Qty: 12 CAPSULE | Refills: 1 | Status: SHIPPED | OUTPATIENT
Start: 2024-03-11

## 2024-03-11 RX ORDER — POTASSIUM CHLORIDE 750 MG/1
10 TABLET, EXTENDED RELEASE ORAL 2 TIMES DAILY
Qty: 3 TABLET | Refills: 0 | Status: SHIPPED | OUTPATIENT
Start: 2024-03-11 | End: 2024-03-13

## 2024-03-11 NOTE — TELEPHONE ENCOUNTER
----- Message from JCARLOS John sent at 3/11/2024  2:38 PM EDT -----  Holter showed elevated baseline heart rate but no evidence of WPW syndrome. Cont with scheduled follow up.

## 2024-03-11 NOTE — TELEPHONE ENCOUNTER
----- Message from JCARLOS Pittman sent at 3/11/2024 12:57 PM EDT -----  White count is elevated and inflammatory markers elevated.  Await stool results (in process) to determine next steps.  Iron and vitamin D are both low.  He should continue oral replacement of these (rx sent).  Potassium is also low, likely from diarrhea.  Recommend replacement.  eRX sent.

## 2024-03-12 ENCOUNTER — TELEPHONE (OUTPATIENT)
Dept: GASTROENTEROLOGY | Facility: CLINIC | Age: 44
End: 2024-03-12
Payer: COMMERCIAL

## 2024-03-12 LAB
CRYPTOSP DNA STL QL NAA+NON-PROBE: NOT DETECTED
E HISTOLYT DNA STL QL NAA+NON-PROBE: NOT DETECTED
G LAMBLIA DNA STL QL NAA+NON-PROBE: NOT DETECTED

## 2024-03-12 RX ORDER — PREDNISONE 10 MG/1
TABLET ORAL
Qty: 50 TABLET | Refills: 0 | Status: SHIPPED | OUTPATIENT
Start: 2024-03-12 | End: 2024-03-31

## 2024-03-12 NOTE — TELEPHONE ENCOUNTER
----- Message from JCARLOS Pittman sent at 3/12/2024  9:12 AM EDT -----  C diff and bacterial panal negative.  Still awaiting remaining stool studies, but would recommend steroid taper for treatment of flare. Rx sent.

## 2024-03-13 ENCOUNTER — PATIENT MESSAGE (OUTPATIENT)
Dept: GASTROENTEROLOGY | Facility: CLINIC | Age: 44
End: 2024-03-13
Payer: COMMERCIAL

## 2024-03-13 ENCOUNTER — HOSPITAL ENCOUNTER (OUTPATIENT)
Dept: CARDIOLOGY | Facility: HOSPITAL | Age: 44
Discharge: HOME | End: 2024-03-13
Admitting: INTERNAL MEDICINE
Payer: COMMERCIAL

## 2024-03-13 DIAGNOSIS — R07.89 CHEST PAIN, ATYPICAL: ICD-10-CM

## 2024-03-13 LAB
BH CV ECHO MEAS - ACS: 2.7 CM
BH CV ECHO MEAS - AO MAX PG: 5.5 MMHG
BH CV ECHO MEAS - AO MEAN PG: 3 MMHG
BH CV ECHO MEAS - AO ROOT DIAM: 3.4 CM
BH CV ECHO MEAS - AO V2 MAX: 117 CM/SEC
BH CV ECHO MEAS - AO V2 VTI: 19.6 CM
BH CV ECHO MEAS - AVA(I,D): 2.39 CM2
BH CV ECHO MEAS - EDV(CUBED): 85.2 ML
BH CV ECHO MEAS - EDV(MOD-SP2): 86.9 ML
BH CV ECHO MEAS - EDV(MOD-SP4): 96.6 ML
BH CV ECHO MEAS - EF(MOD-BP): 56.9 %
BH CV ECHO MEAS - EF(MOD-SP2): 53.6 %
BH CV ECHO MEAS - EF(MOD-SP4): 55.6 %
BH CV ECHO MEAS - ESV(CUBED): 29.8 ML
BH CV ECHO MEAS - ESV(MOD-SP2): 40.3 ML
BH CV ECHO MEAS - ESV(MOD-SP4): 42.9 ML
BH CV ECHO MEAS - FS: 29.5 %
BH CV ECHO MEAS - IVS/LVPW: 0.73 CM
BH CV ECHO MEAS - IVSD: 0.8 CM
BH CV ECHO MEAS - LA DIMENSION: 4.1 CM
BH CV ECHO MEAS - LAT PEAK E' VEL: 9.3 CM/SEC
BH CV ECHO MEAS - LV DIASTOLIC VOL/BSA (35-75): 41.1 CM2
BH CV ECHO MEAS - LV MASS(C)D: 137.8 GRAMS
BH CV ECHO MEAS - LV MAX PG: 3.1 MMHG
BH CV ECHO MEAS - LV MEAN PG: 2 MMHG
BH CV ECHO MEAS - LV SYSTOLIC VOL/BSA (12-30): 18.2 CM2
BH CV ECHO MEAS - LV V1 MAX: 88.6 CM/SEC
BH CV ECHO MEAS - LV V1 VTI: 14.9 CM
BH CV ECHO MEAS - LVIDD: 4.4 CM
BH CV ECHO MEAS - LVIDS: 3.1 CM
BH CV ECHO MEAS - LVOT AREA: 3.1 CM2
BH CV ECHO MEAS - LVOT DIAM: 2 CM
BH CV ECHO MEAS - LVPWD: 1.1 CM
BH CV ECHO MEAS - MED PEAK E' VEL: 10.1 CM/SEC
BH CV ECHO MEAS - MV A MAX VEL: 54 CM/SEC
BH CV ECHO MEAS - MV DEC TIME: 0.21 SEC
BH CV ECHO MEAS - MV E MAX VEL: 46.6 CM/SEC
BH CV ECHO MEAS - MV E/A: 0.86
BH CV ECHO MEAS - SI(MOD-SP2): 19.8 ML/M2
BH CV ECHO MEAS - SI(MOD-SP4): 22.8 ML/M2
BH CV ECHO MEAS - SV(LVOT): 46.8 ML
BH CV ECHO MEAS - SV(MOD-SP2): 46.6 ML
BH CV ECHO MEAS - SV(MOD-SP4): 53.7 ML
BH CV ECHO MEAS - TAPSE (>1.6): 3.1 CM
BH CV ECHO MEASUREMENTS AVERAGE E/E' RATIO: 4.8
LEFT ATRIUM VOLUME INDEX: 19.5 ML/M2

## 2024-03-13 PROCEDURE — 93306 TTE W/DOPPLER COMPLETE: CPT

## 2024-03-13 RX ORDER — ONDANSETRON 8 MG/1
8 TABLET, ORALLY DISINTEGRATING ORAL EVERY 8 HOURS PRN
Qty: 30 TABLET | Refills: 1 | Status: SHIPPED | OUTPATIENT
Start: 2024-03-13

## 2024-03-13 NOTE — TELEPHONE ENCOUNTER
From: Hi Amaya  To: Tonia Cannon  Sent: 3/13/2024 10:12 AM EDT  Subject: Iron    I think the iron is messing with my stomach pretty bad. Can I get these in an injection? Also could I get ondansetron for my nausea? Thanks for your help.

## 2024-03-15 ENCOUNTER — OFFICE VISIT (OUTPATIENT)
Dept: FAMILY MEDICINE CLINIC | Age: 44
End: 2024-03-15
Payer: COMMERCIAL

## 2024-03-15 VITALS
BODY MASS INDEX: 27.91 KG/M2 | HEIGHT: 76 IN | TEMPERATURE: 98.8 F | HEART RATE: 107 BPM | WEIGHT: 229.2 LBS | DIASTOLIC BLOOD PRESSURE: 74 MMHG | SYSTOLIC BLOOD PRESSURE: 104 MMHG

## 2024-03-15 DIAGNOSIS — F41.1 GENERALIZED ANXIETY DISORDER: ICD-10-CM

## 2024-03-15 DIAGNOSIS — I10 ESSENTIAL HYPERTENSION: ICD-10-CM

## 2024-03-15 DIAGNOSIS — Z00.00 PHYSICAL EXAM: Primary | ICD-10-CM

## 2024-03-15 LAB — CALPROTECTIN STL-MCNT: >8000 UG/G (ref 0–120)

## 2024-03-15 PROCEDURE — 99396 PREV VISIT EST AGE 40-64: CPT | Performed by: FAMILY MEDICINE

## 2024-03-15 RX ORDER — POTASSIUM CHLORIDE 750 MG/1
10 TABLET, FILM COATED, EXTENDED RELEASE ORAL 2 TIMES DAILY
COMMUNITY
Start: 2024-03-11

## 2024-03-15 NOTE — Clinical Note
Please TICKLE to call him in 10 days.  Has he resumed lisinopril at 10 mg daily?  How are his blood pressures doing?  Thanks.

## 2024-03-15 NOTE — PROGRESS NOTES
Hi Amaya presents to [facility].    Chief Complaint:  Annual physical    Subjective   History of Present Illness:  Alex is in today for annual physical.  He is 43 years old and works for the city of [place] where he has been for 21+ years.  He does not smoke after stopping last year.  He has not had much in the way of alcohol recently.  He has Crohn's disease and is up-to-date on colonoscopy which he has periodically through gastroenterology.  He is not aware of any family history of prostate cancer.     He also has hypertension for which he remains on lisinopril.  His blood pressure is low normal today.  He has been struggling with his Crohn's disease recently and has lost a significant amount of weight over the last few weeks.  He is asking whether we might back off on lisinopril.     Regarding anxiety, Alex has been taking sertraline for the last few months.  The medication has been very helpful with the stress that he was dealing with.  There is still uncertainty regarding his work situation, but he is functioning much better.    Review of Systems:  Review of Systems   Constitutional:  Negative for chills and fever.   Respiratory:  Negative for cough and shortness of breath.    Cardiovascular:  Negative for chest pain and palpitations.   Gastrointestinal:  Positive for abdominal pain and diarrhea (with mucus and blood). Negative for nausea and vomiting.        Objective   Medical History:  Past Medical History:    Ankylosing spondylitis    Crohn's disease    GERD (gastroesophageal reflux disease)    Psoriasis    Thoracic back pain     Past Surgical History:    COLONOSCOPY    COLONOSCOPY    Procedure: COLONOSCOPY WITH BIOPSIES;  Surgeon: Lorena Hendricks MD;  Location: [facility];  Service: Gastroenterology;  Laterality: N/A;  COLITIS, ILEITIS, PSEUDO POLYPS    ENDOSCOPY    SHOULDER SURGERY    VASECTOMY      Family History   Problem Relation Age of Onset     Stroke Paternal Grandfather     Diabetes Paternal Grandfather     Heart attack Paternal Grandfather     Coronary artery disease Paternal Grandfather      Social History     Tobacco Use    Smoking status: Former     Current packs/day: 0.00     Average packs/day: 0.3 packs/day for 20.0 years (5.0 ttl pk-yrs)     Types: Cigarettes     Start date: 2002     Quit date: 2022     Years since quittin.2    Smokeless tobacco: Never   Substance Use Topics    Alcohol use: Never       Health Maintenance Due   Topic Date Due    TDAP/TD VACCINES (2 - Td or Tdap) 2023    LIPID PANEL  03/15/2024        Immunization History   Administered Date(s) Administered    COVID-19 ([employer]) 2021, 2021, 2022    Flu Vaccine Intradermal Quad 18-64YR 2021    Flu Vaccine Quad PF 6-35MO 10/01/2022    Hepatitis B Adult/Adolescent IM 2003, 2003, 10/07/2003    Tdap 2013       Allergies   Allergen Reactions    Amoxicillin Unknown - Low Severity    Oxycodone-Acetaminophen Unknown - High Severity    Remicade [Infliximab] Unknown - High Severity        Medications:  Current Outpatient Medications on File Prior to Visit   Medication Sig    potassium chloride 10 MEQ CR tablet Take 1 tablet by mouth 2 (Two) Times a Day.    Iron, Ferrous Sulfate, 325 (65 Fe) MG tablet Take 352 mg by mouth Daily.    lisinopril (PRINIVIL,ZESTRIL) 20 MG tablet Take 1 tablet by mouth Daily.    Misc Natural Products (SARSAPARILLA ROOT PO) Take 1,000 mg by mouth.    omeprazole (priLOSEC) 40 MG capsule TAKE ONE CAPSULE BY MOUTH EVERY DAY    ondansetron ODT (ZOFRAN-ODT) 8 MG disintegrating tablet Place 1 tablet on the tongue Every 8 (Eight) Hours As Needed for Nausea or Vomiting.    predniSONE (DELTASONE) 10 MG tablet Take 4 tablets by mouth Daily for 5 days, THEN 3 tablets Daily for 5 days, THEN 2 tablets Daily for 5 days, THEN 1 tablet Daily for 5 days.    sertraline (ZOLOFT) 50 MG tablet Take 1 tablet by mouth Daily.     "vitamin D (ERGOCALCIFEROL) 1.25 MG (85924 UT) capsule capsule Take 1 capsule by mouth Every 7 (Seven) Days.     No current facility-administered medications on file prior to visit.       Vital Signs:   /74 (BP Location: Left arm, Patient Position: Sitting)   Pulse 107   Temp 98.8 °F (37.1 °C) (Oral)   Ht 193 cm (75.98\")   Wt 104 kg (229 lb 3.2 oz)   BMI 27.91 kg/m²       Physical Exam:  Physical Exam  Vitals and nursing note reviewed.   Constitutional:       General: He is not in acute distress.     Appearance: He is not ill-appearing.   HENT:      Right Ear: Tympanic membrane and ear canal normal.      Left Ear: Tympanic membrane and ear canal normal.      Mouth/Throat:      Mouth: Mucous membranes are moist.      Comments: Pharynx appears normal  Eyes:      Extraocular Movements: Extraocular movements intact.      Pupils: Pupils are equal, round, and reactive to light.   Neck:      Thyroid: No thyromegaly.   Cardiovascular:      Rate and Rhythm: Normal rate and regular rhythm.      Heart sounds: No murmur heard.  Pulmonary:      Effort: Pulmonary effort is normal.      Breath sounds: Normal breath sounds.   Abdominal:      General: There is no distension.      Palpations: Abdomen is soft. There is no mass.      Tenderness: There is abdominal tenderness (mild generalized). There is no left CVA tenderness or rebound.   Musculoskeletal:      Cervical back: Normal range of motion.   Skin:     Findings: No lesion or rash.   Neurological:      General: No focal deficit present.      Mental Status: He is oriented to person, place, and time.      Cranial Nerves: No cranial nerve deficit.   Psychiatric:         Mood and Affect: Mood normal.       Result Review   The following data was reviewed by Filiberto Saucedo MD on 03/15/2024.  Lab Results   Component Value Date    WBC 13.92 (H) 03/08/2024    HGB 13.7 03/08/2024    HCT 42.9 03/08/2024    MCV 87.9 03/08/2024     03/08/2024     Lab Results "   Component Value Date    GLUCOSE 107 (H) 03/08/2024    BUN 5 (L) 03/08/2024    CREATININE 0.84 03/08/2024     03/08/2024    K 3.3 (L) 03/08/2024     03/08/2024    CO2 23.9 03/08/2024    CALCIUM 8.7 03/08/2024    PROTEINTOT 6.9 03/08/2024    ALBUMIN 4.1 03/08/2024    ALT 12 03/08/2024    AST 11 03/08/2024    ALKPHOS 115 03/08/2024    BILITOT 0.3 03/08/2024    EGFR 112.4 03/08/2024    GLOB 2.8 03/08/2024    AGRATIO 1.5 03/08/2024    BCR 6.0 (L) 03/08/2024    ANIONGAP 14.1 03/08/2024      Lab Results   Component Value Date    CHOL 163 03/15/2023    CHLPL 144 09/03/2020    TRIG 148 03/15/2023    HDL 31 (L) 03/15/2023     (H) 03/15/2023     Lab Results   Component Value Date    TSH 2.890 03/15/2023     Lab Results   Component Value Date    HGBA1C 4.90 03/15/2023     BMI is >= 25 and <30. (Overweight) The following options were offered after discussion;: exercise counseling/recommendations and nutrition counseling/recommendations         Assessment and Plan:   Today, we have reviewed his care.  Alex is basically up-to-date on recommended cancer screenings at this time.  He has recently been struggling with a flare of Crohn's and is currently on a prednisone taper.  I am concerned by how much weight he has lost over the last few weeks.  We will recommend he NOT take lisinopril for a few days.  I did recommend he move ahead with 1/2 tablet daily thereafter.  We will plan to refill this depending on how his numbers look.  There is no other short-term change I would recommend.  Tentative follow-up with me will be again in a year, sooner if needed.  He will be following up with gastroenterology at [facility] at some point.  I did encourage him to consider going to the emergency department if he thinks he is getting dehydrated.    Diagnoses and all orders for this visit:    1. Physical exam (Primary)    2. Essential hypertension  Comments:  As above.    3. Generalized anxiety disorder  Comments:  As  above.    Follow Up  Return in about 1 year (around 3/15/2025) for Recheck, Annual physical.  Patient was given instructions and counseling regarding his condition or for health maintenance advice. Please see specific information pulled into the AVS if appropriate.

## 2024-03-18 ENCOUNTER — TELEPHONE (OUTPATIENT)
Dept: GASTROENTEROLOGY | Facility: CLINIC | Age: 44
End: 2024-03-18
Payer: COMMERCIAL

## 2024-03-18 NOTE — TELEPHONE ENCOUNTER
Pt notified of results. Pt states as of today he feels a little bit better today, but had a rough weekend.

## 2024-03-18 NOTE — TELEPHONE ENCOUNTER
What dose of prednisone is he currently taking?  I would like for him to stay on 40 mg for two weeks and then taper down.

## 2024-03-18 NOTE — TELEPHONE ENCOUNTER
----- Message from JCARLOS Pittman sent at 3/18/2024  9:24 AM EDT -----  Fecal calprotectin very elevated.  Is he feeling better with the prednisone?

## 2024-03-25 ENCOUNTER — TELEPHONE (OUTPATIENT)
Dept: FAMILY MEDICINE CLINIC | Age: 44
End: 2024-03-25
Payer: COMMERCIAL

## 2024-03-25 NOTE — TELEPHONE ENCOUNTER
Pt states he has not restarted yet, he is still struggling with his Crohn's and doesn't want to take anything more than he has to. States bp running 110-127/67-83

## 2024-03-27 RX ORDER — PREDNISONE 10 MG/1
TABLET ORAL
Qty: 50 TABLET | Refills: 0 | Status: SHIPPED | OUTPATIENT
Start: 2024-03-27

## 2024-03-27 NOTE — TELEPHONE ENCOUNTER
Patient is requesting prednisone. Order pended.  Last ov: 8/3/23  Next ov: 5/14/24  Last refill: 3/12/24

## 2024-03-29 ENCOUNTER — TELEPHONE (OUTPATIENT)
Dept: GASTROENTEROLOGY | Facility: CLINIC | Age: 44
End: 2024-03-29
Payer: COMMERCIAL

## 2024-03-29 NOTE — TELEPHONE ENCOUNTER
Patients rheumatologist put a referral for gastro due to some current symptoms that the patient is having, he has already been scheduled with [facility] for a second opinion.  [facility] called the patient and told him that his referral was denied and he would have to stay with our office.  Patient is still scheduled with [facility] gastro, I have spoken with the patient and explained the mix up and that he is still scheduled for his second opinion, patient verbalized understanding.

## 2024-04-01 DIAGNOSIS — K50.80 CROHN'S DISEASE OF BOTH SMALL AND LARGE INTESTINE WITHOUT COMPLICATION: Primary | ICD-10-CM

## 2024-04-11 RX ORDER — PREDNISONE 10 MG/1
TABLET ORAL
Qty: 50 TABLET | Refills: 0 | Status: SHIPPED | OUTPATIENT
Start: 2024-04-11

## 2024-04-15 ENCOUNTER — HOSPITAL ENCOUNTER (OUTPATIENT)
Dept: CT IMAGING | Facility: HOSPITAL | Age: 44
Discharge: HOME | End: 2024-04-15
Admitting: NURSE PRACTITIONER
Payer: COMMERCIAL

## 2024-04-15 DIAGNOSIS — K50.80 CROHN'S DISEASE OF BOTH SMALL AND LARGE INTESTINE WITHOUT COMPLICATION: ICD-10-CM

## 2024-04-15 LAB
CREAT BLDA-MCNC: 0.9 MG/DL (ref 0.6–1.3)
EGFRCR SERPLBLD CKD-EPI 2021: 110 ML/MIN/1.73

## 2024-04-15 PROCEDURE — 82565 ASSAY OF CREATININE: CPT

## 2024-04-15 PROCEDURE — 25510000001 IOPAMIDOL PER 1 ML: Performed by: NURSE PRACTITIONER

## 2024-04-15 PROCEDURE — 74177 CT ABD & PELVIS W/CONTRAST: CPT

## 2024-04-15 RX ADMIN — IOPAMIDOL 100 ML: 755 INJECTION, SOLUTION INTRAVENOUS at 14:58

## 2024-04-18 ENCOUNTER — TELEPHONE (OUTPATIENT)
Dept: GASTROENTEROLOGY | Facility: CLINIC | Age: 44
End: 2024-04-18
Payer: COMMERCIAL

## 2024-04-18 NOTE — TELEPHONE ENCOUNTER
Spoke to patient. He is aware of results. He has appointment on 5/14 at [facility]. Dr. Reyes's mariah is currently working on Stelara for patient.

## 2024-04-18 NOTE — TELEPHONE ENCOUNTER
----- Message from Tonia Cannon, JCARLOS sent at 4/17/2024  1:28 PM EDT -----  Please let pt know that CT is c/w inflammation throughout the colon.  I would like to proceed with getting him started on stelera while he awaits consult with [facility].  Please let me know what I need to do to get this going as I recall he had already reached out to them.

## 2024-04-18 NOTE — TELEPHONE ENCOUNTER
Nata from [facility] with me called and left a vm checking on pt. States he has not heard anything about scheduling infusion of stelara. She is requesting a return call at   [phone] for any updates and progress .

## 2024-04-29 RX ORDER — PREDNISONE 10 MG/1
TABLET ORAL
Qty: 50 TABLET | Refills: 0 | Status: SHIPPED | OUTPATIENT
Start: 2024-04-29

## 2024-04-29 NOTE — TELEPHONE ENCOUNTER
Patient is requesting a refill of Prednisone 10 MG tablet:    Last refill: 4/11/2024   Last OV: 8/3/23   No further appts made at this time.     Per chart pt has upcoming appt with Nicola Brown at [facility] on 5/14/2024

## 2024-06-12 ENCOUNTER — OFFICE VISIT (OUTPATIENT)
Dept: FAMILY MEDICINE CLINIC | Age: 44
End: 2024-06-12
Payer: COMMERCIAL

## 2024-06-12 VITALS
TEMPERATURE: 98.2 F | SYSTOLIC BLOOD PRESSURE: 156 MMHG | DIASTOLIC BLOOD PRESSURE: 98 MMHG | HEIGHT: 76 IN | WEIGHT: 243.2 LBS | BODY MASS INDEX: 29.61 KG/M2 | OXYGEN SATURATION: 98 % | HEART RATE: 90 BPM

## 2024-06-12 DIAGNOSIS — J01.00 ACUTE NON-RECURRENT MAXILLARY SINUSITIS: ICD-10-CM

## 2024-06-12 DIAGNOSIS — H65.93 MIDDLE EAR EFFUSION, BILATERAL: Primary | ICD-10-CM

## 2024-06-12 PROCEDURE — 99213 OFFICE O/P EST LOW 20 MIN: CPT | Performed by: NURSE PRACTITIONER

## 2024-06-12 RX ORDER — RISANKIZUMAB-RZAA 180 MG/1.2
KIT SUBCUTANEOUS
COMMUNITY
Start: 2024-05-31

## 2024-06-12 RX ORDER — CEFDINIR 300 MG/1
300 CAPSULE ORAL 2 TIMES DAILY
Qty: 14 CAPSULE | Refills: 0 | Status: SHIPPED | OUTPATIENT
Start: 2024-06-12 | End: 2024-06-19

## 2024-06-12 RX ORDER — USTEKINUMAB 45 MG/.5ML
INJECTION, SOLUTION SUBCUTANEOUS
COMMUNITY
Start: 2024-03-26

## 2024-06-12 RX ORDER — POTASSIUM CHLORIDE 20 MEQ/1
40 TABLET, EXTENDED RELEASE ORAL DAILY
COMMUNITY
Start: 2024-05-31

## 2024-06-12 RX ORDER — FLUTICASONE PROPIONATE 50 MCG
2 SPRAY, SUSPENSION (ML) NASAL DAILY
Qty: 11.1 G | Refills: 1 | Status: SHIPPED | OUTPATIENT
Start: 2024-06-12

## 2024-06-12 RX ORDER — HYOSCYAMINE SULFATE 0.125 MG
TABLET ORAL
COMMUNITY
Start: 2024-04-09 | End: 2024-06-12

## 2024-06-12 NOTE — PROGRESS NOTES
"Chief Complaint  Nasal Congestion (Sx started Saturday afternoon ) and Headache    Subjective        Hi Amaya presents to [facility]  Sinus Problem  Chronicity: Symptoms started on 6/8/2024. The problem has been gradually worsening since onset. There has been no fever. His pain is at a severity of 5/10. The pain is moderate. Associated symptoms include headaches and sinus pressure. Pertinent negatives include no chills, diaphoresis, shortness of breath or sore throat. (Dizziness) Treatments tried: DayQuil, Coricidin, Sinex & StayHist. The treatment provided mild relief.   During today's visit he also presents with elevated blood pressure.  Patient states that he is aware that he has been using the medication Stahist as a last resort for symptom relief prior to coming to clinic.  Patient reports that he has tried Coricidin with regards to his blood pressure and that did not provide any symptom relief.   Significant medical diagnosis includes includes Crohn's and he is currently on the biologic medication Skyrizi.  Patient reports that he is also established with a rheumatologist as well.    Objective   Vital Signs:  /98 (BP Location: Right arm, Patient Position: Sitting, Cuff Size: Adult)   Pulse 90   Temp 98.2 °F (36.8 °C) (Oral)   Ht 193 cm (75.98\")   Wt 110 kg (243 lb 3.2 oz)   SpO2 98% Comment: room air  BMI 29.62 kg/m²   Estimated body mass index is 29.62 kg/m² as calculated from the following:    Height as of this encounter: 193 cm (75.98\").    Weight as of this encounter: 110 kg (243 lb 3.2 oz).               Physical Exam  Vitals reviewed. Exam conducted with a chaperone present (seen with NP student Roxy Plascencia).   HENT:      Right Ear: Decreased hearing noted. Tenderness present. No drainage. A middle ear effusion is present. Tympanic membrane is erythematous.      Left Ear: Decreased hearing noted. Tenderness present. No drainage. A middle ear " effusion is present. Tympanic membrane is erythematous.      Nose: Nose normal.      Mouth/Throat:      Lips: Pink.      Mouth: Mucous membranes are dry.      Pharynx: Oropharynx is clear. No oropharyngeal exudate or posterior oropharyngeal erythema.   Cardiovascular:      Rate and Rhythm: Normal rate and regular rhythm.   Pulmonary:      Effort: Pulmonary effort is normal.      Breath sounds: Normal breath sounds and air entry.   Lymphadenopathy:      Head:      Right side of head: Tonsillar adenopathy present.      Left side of head: Tonsillar adenopathy present.   Skin:     General: Skin is warm and dry.   Neurological:      Mental Status: He is alert.   Psychiatric:         Attention and Perception: Attention and perception normal.         Behavior: Behavior is cooperative.        Result Review :                     Assessment and Plan     Diagnoses and all orders for this visit:    1. Middle ear effusion, bilateral (Primary)  Comments:  Patient to discontinue stayhist.  Flonase prescribed.  Orders:  -     fluticasone (FLONASE) 50 MCG/ACT nasal spray; 2 sprays into the nostril(s) as directed by provider Daily.  Dispense: 11.1 g; Refill: 1    2. Acute non-recurrent maxillary sinusitis  Comments:  Omnicef prescription sent.  Education provided to complete full course of antibiotics.  Patient to contact office if new or worsening symptoms.  Orders:  -     cefdinir (OMNICEF) 300 MG capsule; Take 1 capsule by mouth 2 (Two) Times a Day for 7 days.  Dispense: 14 capsule; Refill: 0    Education to patient provided on the importance of appropriate OTC medications related to his blood pressure, patient to avoid any type of decongestants.         Follow Up     Return if symptoms worsen or fail to improve.  Patient was given instructions and counseling regarding his condition or for health maintenance advice. Please see specific information pulled into the AVS if appropriate.

## 2024-06-19 DIAGNOSIS — I10 ESSENTIAL HYPERTENSION: ICD-10-CM

## 2024-06-19 RX ORDER — LISINOPRIL 20 MG/1
20 TABLET ORAL DAILY
Qty: 90 TABLET | Refills: 3 | Status: SHIPPED | OUTPATIENT
Start: 2024-06-19

## 2024-06-19 NOTE — TELEPHONE ENCOUNTER
I have refilled this.  Please TICKLE for blood pressure check in 4 weeks.  His blood pressure was somewhat elevated at a recent acute visit.  Thanks.

## 2024-07-17 ENCOUNTER — TELEPHONE (OUTPATIENT)
Dept: FAMILY MEDICINE CLINIC | Age: 44
End: 2024-07-17
Payer: COMMERCIAL

## 2024-07-17 NOTE — TELEPHONE ENCOUNTER
----- Message from Irene SEVILLA sent at 6/19/2024 11:50 AM EDT -----  Please TICKLE for blood pressure check in 4 weeks.  His blood pressure was somewhat elevated at a recent acute visit.  Thanks.

## 2024-07-24 ENCOUNTER — CLINICAL SUPPORT (OUTPATIENT)
Dept: FAMILY MEDICINE CLINIC | Age: 44
End: 2024-07-24
Payer: COMMERCIAL

## 2024-07-24 VITALS — HEART RATE: 74 BPM | SYSTOLIC BLOOD PRESSURE: 130 MMHG | DIASTOLIC BLOOD PRESSURE: 85 MMHG

## 2024-07-24 NOTE — PROGRESS NOTES
Vitals:    07/24/24 1130   BP: 130/85   BP Location: Left arm   Patient Position: Sitting   Pulse: 74     Good afternoon, Alex.  I hope you are well.  Your blood pressure earlier today was reasonable.  Please continue your current medications, and plan to see me again in March as scheduled, sooner if needed.  Let me know if you have other concerns.    Filiberto Saucedo MD  [facility]    [employer] messages are not reviewed on an urgent basis.  It may be several days before we review and/or respond to your message.  If you have an urgent need, please call the office at [phone].

## 2024-10-01 DIAGNOSIS — K21.9 GASTROESOPHAGEAL REFLUX DISEASE, UNSPECIFIED WHETHER ESOPHAGITIS PRESENT: ICD-10-CM

## 2024-10-01 RX ORDER — OMEPRAZOLE 40 MG/1
40 CAPSULE, DELAYED RELEASE ORAL DAILY
Qty: 90 CAPSULE | Refills: 1 | OUTPATIENT
Start: 2024-10-01

## 2024-10-01 NOTE — TELEPHONE ENCOUNTER
Pt is requesting a refill of Omeprazole 40 MG capsule    Last refill: 6/19/2024   Last ov: 8/4/23  Next ov: n/a     Pt has cx the last 2 appts

## 2025-03-17 ENCOUNTER — OFFICE VISIT (OUTPATIENT)
Dept: FAMILY MEDICINE CLINIC | Age: 45
End: 2025-03-17
Payer: COMMERCIAL

## 2025-03-17 VITALS
SYSTOLIC BLOOD PRESSURE: 122 MMHG | WEIGHT: 284 LBS | DIASTOLIC BLOOD PRESSURE: 79 MMHG | HEIGHT: 76 IN | OXYGEN SATURATION: 96 % | HEART RATE: 70 BPM | BODY MASS INDEX: 34.58 KG/M2 | TEMPERATURE: 97.7 F

## 2025-03-17 DIAGNOSIS — E66.1 CLASS 1 DRUG-INDUCED OBESITY WITH SERIOUS COMORBIDITY AND BODY MASS INDEX (BMI) OF 34.0 TO 34.9 IN ADULT: ICD-10-CM

## 2025-03-17 DIAGNOSIS — I10 ESSENTIAL HYPERTENSION: ICD-10-CM

## 2025-03-17 DIAGNOSIS — E66.811 CLASS 1 DRUG-INDUCED OBESITY WITH SERIOUS COMORBIDITY AND BODY MASS INDEX (BMI) OF 34.0 TO 34.9 IN ADULT: ICD-10-CM

## 2025-03-17 DIAGNOSIS — F41.1 GENERALIZED ANXIETY DISORDER: ICD-10-CM

## 2025-03-17 DIAGNOSIS — Z00.00 PHYSICAL EXAM: Primary | ICD-10-CM

## 2025-03-17 LAB
EXPIRATION DATE: NORMAL
HBA1C MFR BLD: 5 % (ref 4.5–5.7)
Lab: NORMAL

## 2025-03-17 RX ORDER — MOXIFLOXACIN 5 MG/ML
SOLUTION/ DROPS OPHTHALMIC
COMMUNITY
Start: 2025-02-21

## 2025-03-17 RX ORDER — LISINOPRIL 20 MG/1
20 TABLET ORAL DAILY
Qty: 90 TABLET | Refills: 3 | Status: SHIPPED | OUTPATIENT
Start: 2025-03-17

## 2025-03-17 RX ORDER — FOLIC ACID 1 MG/1
1 TABLET ORAL DAILY
COMMUNITY
Start: 2025-01-27

## 2025-03-17 NOTE — PROGRESS NOTES
Hi Amaya presents to [facility].    Chief Complaint:  Annual physical    Subjective   History of Present Illness:  Alex is in today for annual physical.  He is 44 years old and works for the city of [place] where he has been for 22+ years.  He does not smoke after stopping in the last 2-3 years.  Alex states that he will have a caleb when they go out.  He does not drink much beyond that though.  He has Crohn's disease and is up-to-date on colonoscopy which he has periodically through gastroenterology.  He is not aware of any family history of prostate cancer.     He also has hypertension for which he remains on lisinopril.  His blood pressure is in a good range today.  It has been pretty good as well when he has had it checked outside of this office.  He is not aware of obvious side effects from lisinopril.     Regarding anxiety, Alex has been taking sertraline for the last few months.  He has been doing fairly well emotionally and would like to continue the medication.    Review of Systems:  Review of Systems   Constitutional:  Negative for chills and fever.   Respiratory:  Negative for cough and shortness of breath.    Cardiovascular:  Negative for chest pain and palpitations.   Gastrointestinal:  Negative for abdominal pain, nausea and vomiting.      Objective   Medical History:  Past Medical History:    Abnormal ECG    Ankylosing spondylitis    Anxiety    Colon polyp    Crohn's disease    GERD (gastroesophageal reflux disease)    Hypertension    Irritable bowel syndrome    Low back pain    Ankylosing spondylitis    Psoriasis    Thoracic back pain     Past Surgical History:    COLONOSCOPY    COLONOSCOPY    Procedure: COLONOSCOPY WITH BIOPSIES;  Surgeon: Lorena Hendricks MD;  Location: [facility];  Service: Gastroenterology;  Laterality: N/A;  COLITIS, ILEITIS, PSEUDO POLYPS    ENDOSCOPY    SHOULDER SURGERY    VASECTOMY      Family History   Problem  Relation Age of Onset    Stroke Paternal Grandfather     Diabetes Paternal Grandfather     Heart attack Paternal Grandfather     Coronary artery disease Paternal Grandfather     Alcohol abuse Father     Early death Father         He was 46. Carbon monoxide poisoning     Social History     Tobacco Use    Smoking status: Former     Current packs/day: 0.00     Average packs/day: 0.3 packs/day for 25.0 years (7.5 ttl pk-yrs)     Types: Cigarettes     Start date: 2002     Quit date: 2022     Years since quittin.2    Smokeless tobacco: Never    Tobacco comments:     Quit    Substance Use Topics    Alcohol use: Yes     Alcohol/week: 1.0 standard drink of alcohol     Types: 1 Drinks containing 0.5 oz of alcohol per week     Comment: 1 or 2 drinks every couple weeks       Health Maintenance Due   Topic Date Due    TDAP/TD VACCINES (2 - Td or Tdap) 2023    LIPID PANEL  03/15/2024    BMI FOLLOWUP  03/15/2025        Immunization History   Administered Date(s) Administered    COVID-19 ([facility]) 2021, 2021, 2022    Flu Vaccine Intradermal Quad 18-64YR 2021    Flu Vaccine Quad PF 6-35MO 10/01/2022    Hepatitis B Adult/Adolescent IM 2003, 2003, 10/07/2003    Tdap 2013       Allergies   Allergen Reactions    Amoxicillin Unknown - Low Severity    Oxycodone-Acetaminophen Unknown - High Severity    Remicade [Infliximab] Unknown - High Severity    Sulfasalazine Unknown - Low Severity        Medications:    Current Outpatient Medications:     Certolizumab Pegol (CIMZIA) 2 X 200 MG kit injection, Inject 2 mL under the skin into the appropriate area as directed.  Inject 2 mLs (400 mg total) subcutaneously once every 4 weeks., Disp: , Rfl:     fluticasone (FLONASE) 50 MCG/ACT nasal spray, 2 sprays into the nostril(s) as directed by provider Daily., Disp: 11.1 g, Rfl: 1    folic acid (FOLVITE) 1 MG tablet, Take 1 tablet by mouth Daily., Disp: , Rfl:     Iron, Ferrous Sulfate,  "325 (65 Fe) MG tablet, Take 352 mg by mouth Daily., Disp: 30 tablet, Rfl: 5    lisinopril (PRINIVIL,ZESTRIL) 20 MG tablet, Take 1 tablet by mouth Daily., Disp: 90 tablet, Rfl: 3    Misc Natural Products (SARSAPARILLA ROOT PO), Take 1,000 mg by mouth., Disp: , Rfl:     moxifloxacin (VIGAMOX) 0.5 % ophthalmic solution,  Intill ONE DROP IN THE RIGHT EYE THREE TIMES DAILY, Disp: , Rfl:     omeprazole (priLOSEC) 40 MG capsule, TAKE ONE CAPSULE BY MOUTH EVERY DAY, Disp: 90 capsule, Rfl: 1    potassium chloride (KLOR-CON M20) 20 MEQ CR tablet, Take 2 tablets by mouth Daily., Disp: , Rfl:     sertraline (ZOLOFT) 50 MG tablet, Take 1 tablet by mouth Daily., Disp: 90 tablet, Rfl: 3    Vedolizumab (ENTYVIO IV), Infuse  into a venous catheter. Every 8 weeks, Disp: , Rfl:     vitamin D (ERGOCALCIFEROL) 1.25 MG (50358 UT) capsule capsule, Take 1 capsule by mouth Every 7 (Seven) Days., Disp: 12 capsule, Rfl: 1    Vital Signs:   /79 (BP Location: Right arm, Patient Position: Sitting)   Pulse 70   Temp 97.7 °F (36.5 °C) (Oral)   Ht 193 cm (75.98\")   Wt 129 kg (284 lb)   SpO2 96%   BMI 34.58 kg/m²       Physical Exam:  Physical Exam  Vitals and nursing note reviewed.   Constitutional:       General: He is not in acute distress.     Appearance: He is obese. He is not ill-appearing.   HENT:      Right Ear: Tympanic membrane and ear canal normal.      Left Ear: Tympanic membrane and ear canal normal.      Mouth/Throat:      Mouth: Mucous membranes are moist.      Comments: Pharynx appears normal  Eyes:      Extraocular Movements: Extraocular movements intact.      Pupils: Pupils are equal, round, and reactive to light.   Neck:      Thyroid: No thyromegaly.   Cardiovascular:      Rate and Rhythm: Normal rate and regular rhythm.      Heart sounds: No murmur heard.  Pulmonary:      Effort: Pulmonary effort is normal.      Breath sounds: Normal breath sounds.   Abdominal:      General: There is no distension.      Palpations: " "Abdomen is soft. There is no mass.      Tenderness: There is no abdominal tenderness.   Musculoskeletal:      Cervical back: Normal range of motion.   Skin:     Findings: No lesion or rash.   Neurological:      General: No focal deficit present.      Mental Status: He is oriented to person, place, and time.      Cranial Nerves: No cranial nerve deficit.   Psychiatric:         Mood and Affect: Mood normal.     Result Review   The following data was reviewed by Filiberto Saucedo MD on 03/17/2025.  Lab Results   Component Value Date    WBC 13.92 (H) 03/08/2024    HGB 13.7 03/08/2024    HCT 42.9 03/08/2024    MCV 87.9 03/08/2024     03/08/2024     Lab Results   Component Value Date    GLUCOSE 107 (H) 03/08/2024    BUN 5 (L) 03/08/2024    CREATININE 0.90 04/15/2024     03/08/2024    K 3.3 (L) 03/08/2024     03/08/2024    CALCIUM 8.7 03/08/2024    PROTEINTOT 6.9 03/08/2024    ALBUMIN 4.1 03/08/2024    ALT 12 03/08/2024    AST 11 03/08/2024    ALKPHOS 115 03/08/2024    BILITOT 0.3 03/08/2024    GLOB 2.8 03/08/2024    AGRATIO 1.5 03/08/2024    BCR 6.0 (L) 03/08/2024    ANIONGAP 14.1 03/08/2024    EGFR 110.0 04/15/2024     Lab Results   Component Value Date    CHOL 163 03/15/2023    CHLPL 144 09/03/2020    TRIG 148 03/15/2023    HDL 31 (L) 03/15/2023     (H) 03/15/2023     Lab Results   Component Value Date    TSH 2.890 03/15/2023     Lab Results   Component Value Date    HGBA1C 4.90 03/15/2023     No results found for: \"PSA\"    BMI is >= 30 and <35. (Class 1 Obesity). The following options were offered after discussion;: exercise counseling/recommendations and nutrition counseling/recommendations         Assessment and Plan:   Today, we have reviewed his care.  Overall, Alex remains in good health.  Regarding the annual physical, there are no specific cancer screenings that would be recommended at this time.  Regarding his usual care, we will refill his medications as noted below.  We will " also reach out to rheumatology for a copy of his most recent blood work.  I may order additional testing depending on what they checked.  Fingerstick A1c will be done today.  Alex has had a significant weight gain over the last year at least in part to the use of prednisone.  I have encouraged him to work on good health habits.  Hopefully, he will see his weight trend downward through the course of the year.  Tentative follow-up with me will be again in about a year, sooner if needed.    Diagnoses and all orders for this visit:    1. Physical exam (Primary)  -     POC Glycosylated Hemoglobin (Hb A1C)    2. Essential hypertension  -     lisinopril (PRINIVIL,ZESTRIL) 20 MG tablet; Take 1 tablet by mouth Daily.  Dispense: 90 tablet; Refill: 3    3. Generalized anxiety disorder  -     sertraline (ZOLOFT) 50 MG tablet; Take 1 tablet by mouth Daily.  Dispense: 90 tablet; Refill: 3    4. Class 1 drug-induced obesity with serious comorbidity and body mass index (BMI) of 34.0 to 34.9 in adult  Comments:  As above.    Follow Up  Return in about 1 year (around 3/17/2026) for Recheck, Annual physical.  Patient was given instructions and counseling regarding his condition or for health maintenance advice. Please see specific information pulled into the AVS if appropriate.

## 2025-03-17 NOTE — Clinical Note
Please reach out to Dr. Reyes's office with rheumatology.  I need a copy of his most recent blood work and consultation.  Thanks.

## 2025-03-18 ENCOUNTER — RESULTS FOLLOW-UP (OUTPATIENT)
Dept: FAMILY MEDICINE CLINIC | Age: 45
End: 2025-03-18
Payer: COMMERCIAL

## 2025-03-18 DIAGNOSIS — Z00.00 PHYSICAL EXAM: Primary | ICD-10-CM

## 2025-03-18 DIAGNOSIS — I10 ESSENTIAL HYPERTENSION: ICD-10-CM

## 2025-03-19 LAB
ALT SERPL W P-5'-P-CCNC: 23 U/L (ref 1–41)
CREATININE: 0.79
HGB BLD-MCNC: 14.8 G/DL (ref 13–17.7)
PLATELETS: 259
WBC # BLD AUTO: 8.3 10*3/UL

## 2025-04-08 ENCOUNTER — TRANSCRIBE ORDERS (OUTPATIENT)
Dept: ADMINISTRATIVE | Facility: HOSPITAL | Age: 45
End: 2025-04-08
Payer: COMMERCIAL

## 2025-04-08 ENCOUNTER — HOSPITAL ENCOUNTER (OUTPATIENT)
Dept: GENERAL RADIOLOGY | Facility: HOSPITAL | Age: 45
Discharge: HOME | End: 2025-04-08
Admitting: NURSE PRACTITIONER
Payer: COMMERCIAL

## 2025-04-08 DIAGNOSIS — Z04.2 OBSERVATION FOLLOWING ACCIDENT AT WORK: ICD-10-CM

## 2025-04-08 DIAGNOSIS — M79.89 FOOT SWELLING: ICD-10-CM

## 2025-04-08 DIAGNOSIS — M79.671 RIGHT FOOT PAIN: ICD-10-CM

## 2025-04-08 DIAGNOSIS — M79.671 RIGHT FOOT PAIN: Primary | ICD-10-CM

## 2025-04-08 PROCEDURE — 73630 X-RAY EXAM OF FOOT: CPT

## 2025-04-16 ENCOUNTER — TELEPHONE (OUTPATIENT)
Dept: FAMILY MEDICINE CLINIC | Age: 45
End: 2025-04-16
Payer: COMMERCIAL

## 2025-04-25 ENCOUNTER — LAB (OUTPATIENT)
Dept: LAB | Facility: HOSPITAL | Age: 45
End: 2025-04-25
Payer: COMMERCIAL

## 2025-04-25 DIAGNOSIS — Z00.00 PHYSICAL EXAM: ICD-10-CM

## 2025-04-25 LAB
BACTERIA UR QL AUTO: NORMAL /HPF
BILIRUB UR QL STRIP: NEGATIVE
CHOLEST SERPL-MCNC: 152 MG/DL (ref 0–200)
CLARITY UR: CLEAR
COLOR UR: YELLOW
GLUCOSE UR STRIP-MCNC: NEGATIVE MG/DL
HBA1C MFR BLD: 5 % (ref 4.8–5.6)
HDLC SERPL-MCNC: 31 MG/DL (ref 40–60)
HGB UR QL STRIP.AUTO: NEGATIVE
KETONES UR QL STRIP: NEGATIVE
LDLC SERPL CALC-MCNC: 95 MG/DL (ref 0–100)
LDLC/HDLC SERPL: 2.94 {RATIO}
LEUKOCYTE ESTERASE UR QL STRIP.AUTO: NEGATIVE
NITRITE UR QL STRIP: NEGATIVE
PH UR STRIP.AUTO: 6.5 [PH] (ref 5–8)
PROT UR QL STRIP: NEGATIVE
RBC # UR STRIP: NORMAL /HPF
REF LAB TEST METHOD: NORMAL
SP GR UR STRIP: 1.02 (ref 1–1.03)
SQUAMOUS #/AREA URNS HPF: NORMAL /HPF
TRIGL SERPL-MCNC: 149 MG/DL (ref 0–150)
TSH SERPL DL<=0.05 MIU/L-ACNC: 2.18 UIU/ML (ref 0.27–4.2)
UROBILINOGEN UR QL STRIP: NORMAL
VLDLC SERPL-MCNC: 26 MG/DL (ref 5–40)
WBC # UR STRIP: NORMAL /HPF

## 2025-04-25 PROCEDURE — 80061 LIPID PANEL: CPT

## 2025-04-25 PROCEDURE — 84443 ASSAY THYROID STIM HORMONE: CPT

## 2025-04-25 PROCEDURE — 83036 HEMOGLOBIN GLYCOSYLATED A1C: CPT

## 2025-04-25 PROCEDURE — 36415 COLL VENOUS BLD VENIPUNCTURE: CPT

## 2025-04-25 PROCEDURE — 81001 URINALYSIS AUTO W/SCOPE: CPT

## 2025-08-01 ENCOUNTER — TRANSCRIBE ORDERS (OUTPATIENT)
Dept: ADMINISTRATIVE | Facility: HOSPITAL | Age: 45
End: 2025-08-01
Payer: COMMERCIAL

## 2025-08-01 ENCOUNTER — LAB (OUTPATIENT)
Dept: LAB | Facility: HOSPITAL | Age: 45
End: 2025-08-01
Payer: COMMERCIAL

## 2025-08-01 DIAGNOSIS — K50.10: Primary | ICD-10-CM

## 2025-08-01 DIAGNOSIS — K50.10: ICD-10-CM

## 2025-08-01 PROCEDURE — 86480 TB TEST CELL IMMUN MEASURE: CPT

## 2025-08-01 PROCEDURE — 36415 COLL VENOUS BLD VENIPUNCTURE: CPT

## 2025-08-06 LAB
GAMMA INTERFERON BACKGROUND BLD IA-ACNC: 0.1 IU/ML
M TB IFN-G BLD-IMP: NEGATIVE
M TB IFN-G CD4+ BCKGRND COR BLD-ACNC: 0.07 IU/ML
M TB IFN-G CD4+CD8+ BCKGRND COR BLD-ACNC: 0.06 IU/ML
MITOGEN IGNF BCKGRD COR BLD-ACNC: >10 IU/ML
QUANTIFERON INCUBATION: NORMAL
SERVICE CMNT-IMP: NORMAL

## (undated) DEVICE — Device: Brand: DEFENDO AIR/WATER/SUCTION AND BIOPSY VALVE

## (undated) DEVICE — LINER SURG CANSTR SXN S/RIGD 1500CC

## (undated) DEVICE — SINGLE-USE BIOPSY FORCEPS: Brand: RADIAL JAW 4

## (undated) DEVICE — SOLIDIFIER LIQLOC PLS 1500CC BT

## (undated) DEVICE — Device

## (undated) DEVICE — CONN JET HYDRA H20 AUXILIARY DISP

## (undated) DEVICE — SOL IRRG H2O PL/BG 1000ML STRL